# Patient Record
Sex: FEMALE | Race: BLACK OR AFRICAN AMERICAN | NOT HISPANIC OR LATINO | Employment: FULL TIME | URBAN - METROPOLITAN AREA
[De-identification: names, ages, dates, MRNs, and addresses within clinical notes are randomized per-mention and may not be internally consistent; named-entity substitution may affect disease eponyms.]

---

## 2017-02-21 ENCOUNTER — HOSPITAL ENCOUNTER (OUTPATIENT)
Dept: RADIOLOGY | Facility: HOSPITAL | Age: 57
Discharge: HOME | End: 2017-02-21
Attending: INTERNAL MEDICINE
Payer: COMMERCIAL

## 2017-02-21 DIAGNOSIS — Z12.31 OTHER SCREENING MAMMOGRAM: ICD-10-CM

## 2017-02-21 PROCEDURE — 77067 SCR MAMMO BI INCL CAD: CPT | Mod: TC

## 2017-02-21 PROCEDURE — 77063 BREAST TOMOSYNTHESIS BI: CPT | Mod: 26,,, | Performed by: RADIOLOGY

## 2017-02-21 PROCEDURE — 77067 SCR MAMMO BI INCL CAD: CPT | Mod: 26,,, | Performed by: RADIOLOGY

## 2017-02-27 ENCOUNTER — HOSPITAL ENCOUNTER (OUTPATIENT)
Dept: RADIOLOGY | Facility: HOSPITAL | Age: 57
Discharge: HOME | End: 2017-02-27
Attending: INTERNAL MEDICINE
Payer: COMMERCIAL

## 2017-02-27 DIAGNOSIS — R92.8 ABNORMAL MAMMOGRAM: ICD-10-CM

## 2017-02-27 PROCEDURE — 76642 ULTRASOUND BREAST LIMITED: CPT | Mod: TC,LT

## 2017-02-27 PROCEDURE — 77061 BREAST TOMOSYNTHESIS UNI: CPT | Mod: TC,LT

## 2017-02-27 PROCEDURE — 77065 DX MAMMO INCL CAD UNI: CPT | Mod: 26,LT,, | Performed by: RADIOLOGY

## 2017-02-27 PROCEDURE — 77061 BREAST TOMOSYNTHESIS UNI: CPT | Mod: 26,LT,, | Performed by: RADIOLOGY

## 2017-02-27 PROCEDURE — 76642 ULTRASOUND BREAST LIMITED: CPT | Mod: 26,LT,, | Performed by: RADIOLOGY

## 2017-06-11 RX ORDER — LOSARTAN POTASSIUM AND HYDROCHLOROTHIAZIDE 25; 100 MG/1; MG/1
TABLET ORAL
Qty: 90 TABLET | Refills: 4 | Status: SHIPPED | OUTPATIENT
Start: 2017-06-11 | End: 2018-05-18 | Stop reason: SDUPTHER

## 2017-12-12 ENCOUNTER — PATIENT MESSAGE (OUTPATIENT)
Dept: INTERNAL MEDICINE | Facility: CLINIC | Age: 57
End: 2017-12-12

## 2017-12-12 DIAGNOSIS — Z00.00 ROUTINE GENERAL MEDICAL EXAMINATION AT A HEALTH CARE FACILITY: Primary | ICD-10-CM

## 2017-12-12 DIAGNOSIS — R92.8 ABNORMAL MAMMOGRAM: ICD-10-CM

## 2017-12-27 ENCOUNTER — TELEPHONE (OUTPATIENT)
Dept: INTERNAL MEDICINE | Facility: CLINIC | Age: 57
End: 2017-12-27

## 2017-12-27 NOTE — TELEPHONE ENCOUNTER
----- Message from Sherly Avila sent at 12/26/2017  2:33 PM CST -----  Contact: Patient [phone]  The patient need you to fax an order/prescription for the compression hoses to [facility] at [phone]. She lost the original order/prescription that she got from Dr. Clark.    Thank you

## 2017-12-27 NOTE — TELEPHONE ENCOUNTER
Pt lost her order for compression stockings and would like another one sent to [facility]. Please advise

## 2018-02-23 ENCOUNTER — HOSPITAL ENCOUNTER (OUTPATIENT)
Dept: RADIOLOGY | Facility: HOSPITAL | Age: 58
Discharge: HOME | End: 2018-02-23
Attending: INTERNAL MEDICINE
Payer: COMMERCIAL

## 2018-02-23 VITALS — HEIGHT: 66 IN | BODY MASS INDEX: 41.95 KG/M2 | WEIGHT: 261 LBS

## 2018-02-23 DIAGNOSIS — R92.8 ABNORMAL MAMMOGRAM: ICD-10-CM

## 2018-02-23 PROCEDURE — 77062 BREAST TOMOSYNTHESIS BI: CPT | Mod: 26,,, | Performed by: RADIOLOGY

## 2018-02-23 PROCEDURE — 77066 DX MAMMO INCL CAD BI: CPT | Mod: TC,PO

## 2018-02-23 PROCEDURE — 77066 DX MAMMO INCL CAD BI: CPT | Mod: 26,,, | Performed by: RADIOLOGY

## 2018-02-23 PROCEDURE — 76642 ULTRASOUND BREAST LIMITED: CPT | Mod: 26,LT,, | Performed by: RADIOLOGY

## 2018-02-23 PROCEDURE — 76642 ULTRASOUND BREAST LIMITED: CPT | Mod: TC,PO,LT

## 2018-02-26 ENCOUNTER — TELEPHONE (OUTPATIENT)
Dept: RADIOLOGY | Facility: HOSPITAL | Age: 58
End: 2018-02-26

## 2018-02-26 NOTE — TELEPHONE ENCOUNTER
Spoke with patient. Reviewed breast biopsy procedure and reviewed instructions for breast biopsy. Patient expressed understanding and all questions were answered. Provided patient with my phone number to call for any further concerns or questions.   Patient scheduled breast biopsy at the [facility] on 3/6/18

## 2018-02-26 NOTE — TELEPHONE ENCOUNTER
----- Message from Sorin Alexander sent at 2/26/2018  4:01 PM CST -----  Pt needs to reschedule her biopsy. Pt said that date doesn't work for her. Please call pt at [phone].    Pt said March 12th is a good date after 11am.

## 2018-03-03 ENCOUNTER — LAB VISIT (OUTPATIENT)
Dept: LAB | Facility: HOSPITAL | Age: 58
End: 2018-03-03
Attending: INTERNAL MEDICINE
Payer: COMMERCIAL

## 2018-03-03 DIAGNOSIS — Z00.00 ROUTINE GENERAL MEDICAL EXAMINATION AT A HEALTH CARE FACILITY: ICD-10-CM

## 2018-03-03 LAB
25(OH)D3+25(OH)D2 SERPL-MCNC: 41 NG/ML
ALBUMIN SERPL BCP-MCNC: 3.5 G/DL
ALP SERPL-CCNC: 107 U/L
ALT SERPL W/O P-5'-P-CCNC: 24 U/L
ANION GAP SERPL CALC-SCNC: 10 MMOL/L
AST SERPL-CCNC: 21 U/L
BASOPHILS # BLD AUTO: 0.03 K/UL
BASOPHILS NFR BLD: 0.5 %
BILIRUB SERPL-MCNC: 0.7 MG/DL
BUN SERPL-MCNC: 19 MG/DL
CALCIUM SERPL-MCNC: 9.5 MG/DL
CHLORIDE SERPL-SCNC: 108 MMOL/L
CHOLEST SERPL-MCNC: 159 MG/DL
CHOLEST/HDLC SERPL: 3.4 {RATIO}
CO2 SERPL-SCNC: 25 MMOL/L
CREAT SERPL-MCNC: 0.8 MG/DL
DIFFERENTIAL METHOD: ABNORMAL
EOSINOPHIL # BLD AUTO: 0.1 K/UL
EOSINOPHIL NFR BLD: 1.2 %
ERYTHROCYTE [DISTWIDTH] IN BLOOD BY AUTOMATED COUNT: 13.9 %
EST. GFR  (AFRICAN AMERICAN): >60 ML/MIN/1.73 M^2
EST. GFR  (NON AFRICAN AMERICAN): >60 ML/MIN/1.73 M^2
GLUCOSE SERPL-MCNC: 98 MG/DL
HCT VFR BLD AUTO: 38.9 %
HDLC SERPL-MCNC: 47 MG/DL
HDLC SERPL: 29.6 %
HGB BLD-MCNC: 13.2 G/DL
LDLC SERPL CALC-MCNC: 98.6 MG/DL
LYMPHOCYTES # BLD AUTO: 2.7 K/UL
LYMPHOCYTES NFR BLD: 46.2 %
MCH RBC QN AUTO: 30.9 PG
MCHC RBC AUTO-ENTMCNC: 33.9 G/DL
MCV RBC AUTO: 91 FL
MONOCYTES # BLD AUTO: 0.5 K/UL
MONOCYTES NFR BLD: 8.2 %
NEUTROPHILS # BLD AUTO: 2.6 K/UL
NEUTROPHILS NFR BLD: 43.9 %
NONHDLC SERPL-MCNC: 112 MG/DL
NRBC BLD-RTO: 0 /100 WBC
PLATELET # BLD AUTO: 134 K/UL
PMV BLD AUTO: 11.9 FL
POTASSIUM SERPL-SCNC: 4.1 MMOL/L
PROT SERPL-MCNC: 7.2 G/DL
RBC # BLD AUTO: 4.27 M/UL
SODIUM SERPL-SCNC: 143 MMOL/L
TRIGL SERPL-MCNC: 67 MG/DL
TSH SERPL DL<=0.005 MIU/L-ACNC: 1.39 UIU/ML
WBC # BLD AUTO: 5.87 K/UL

## 2018-03-03 PROCEDURE — 85025 COMPLETE CBC W/AUTO DIFF WBC: CPT

## 2018-03-03 PROCEDURE — 80053 COMPREHEN METABOLIC PANEL: CPT

## 2018-03-03 PROCEDURE — 84443 ASSAY THYROID STIM HORMONE: CPT

## 2018-03-03 PROCEDURE — 36415 COLL VENOUS BLD VENIPUNCTURE: CPT

## 2018-03-03 PROCEDURE — 80061 LIPID PANEL: CPT

## 2018-03-03 PROCEDURE — 82306 VITAMIN D 25 HYDROXY: CPT

## 2018-03-12 ENCOUNTER — HOSPITAL ENCOUNTER (OUTPATIENT)
Dept: RADIOLOGY | Facility: HOSPITAL | Age: 58
Discharge: HOME | End: 2018-03-12
Attending: INTERNAL MEDICINE
Payer: COMMERCIAL

## 2018-03-12 DIAGNOSIS — R92.8 ABNORMAL MAMMOGRAM: ICD-10-CM

## 2018-03-12 PROCEDURE — 77065 DX MAMMO INCL CAD UNI: CPT | Mod: 26,LT,, | Performed by: RADIOLOGY

## 2018-03-12 PROCEDURE — 88342 IMHCHEM/IMCYTCHM 1ST ANTB: CPT | Mod: 26,,, | Performed by: PATHOLOGY

## 2018-03-12 PROCEDURE — 27201068 US BREAST BIOPSY WITH IMAGING 1ST SITE LEFT: Mod: PO

## 2018-03-12 PROCEDURE — 88305 TISSUE EXAM BY PATHOLOGIST: CPT | Performed by: PATHOLOGY

## 2018-03-12 PROCEDURE — 88341 IMHCHEM/IMCYTCHM EA ADD ANTB: CPT | Mod: 26,,, | Performed by: PATHOLOGY

## 2018-03-12 PROCEDURE — 88305 TISSUE EXAM BY PATHOLOGIST: CPT | Mod: 26,,, | Performed by: PATHOLOGY

## 2018-03-12 PROCEDURE — 77065 DX MAMMO INCL CAD UNI: CPT | Mod: TC,PO,LT

## 2018-03-12 PROCEDURE — 88342 IMHCHEM/IMCYTCHM 1ST ANTB: CPT | Performed by: PATHOLOGY

## 2018-03-12 PROCEDURE — 19083 BX BREAST 1ST LESION US IMAG: CPT | Mod: LT,,, | Performed by: RADIOLOGY

## 2018-03-14 ENCOUNTER — TELEPHONE (OUTPATIENT)
Dept: SURGERY | Facility: CLINIC | Age: 58
End: 2018-03-14

## 2018-03-14 NOTE — TELEPHONE ENCOUNTER
Patient was transferred to me, she was returning a call from Dr. Villa. Explained why she was calling, discussed [place] second opinion on her biopsy specimen and that it will be about another week before we have a conclusion. Verbalized understanding to all information

## 2018-03-14 NOTE — PROGRESS NOTES
Jeff Presley,    I will call this patient to explain that we do not see breast cancer but will need to send out to lab for further analysis.

## 2018-03-22 ENCOUNTER — TELEPHONE (OUTPATIENT)
Dept: SURGERY | Facility: CLINIC | Age: 58
End: 2018-03-22

## 2018-03-22 NOTE — TELEPHONE ENCOUNTER
Called patient to discuss final [place] interpretation of pathology. Unable to reach patient, no way to leave voicemail, will try again later.

## 2018-03-23 ENCOUNTER — TELEPHONE (OUTPATIENT)
Dept: SURGERY | Facility: CLINIC | Age: 58
End: 2018-03-23

## 2018-03-23 NOTE — TELEPHONE ENCOUNTER
Attempted to call patient again today regarding [place] interpretation of biopsy, no answer. Will try again later

## 2018-03-27 ENCOUNTER — TELEPHONE (OUTPATIENT)
Dept: SURGERY | Facility: CLINIC | Age: 58
End: 2018-03-27

## 2018-03-27 NOTE — TELEPHONE ENCOUNTER
----- Message from Sorin Alexander sent at 3/27/2018 12:14 PM CDT -----  Pt want to know if her biopsy results are in. Please call pt at [phone]

## 2018-03-27 NOTE — TELEPHONE ENCOUNTER
Returned call to patient, discussed biopsy result and [place] diagnosis of angiolipoma,  no atypia/benign, all questions answered, pt advised to follow up in 6 months with repeat imaging per core biopsy protocol, case was reviewed in core conference and deemed concordant. Patient verbalized understanding of all information

## 2018-05-18 ENCOUNTER — OFFICE VISIT (OUTPATIENT)
Dept: INTERNAL MEDICINE | Facility: CLINIC | Age: 58
End: 2018-05-18
Payer: COMMERCIAL

## 2018-05-18 VITALS
WEIGHT: 257.94 LBS | HEIGHT: 65 IN | HEART RATE: 59 BPM | DIASTOLIC BLOOD PRESSURE: 60 MMHG | BODY MASS INDEX: 42.98 KG/M2 | OXYGEN SATURATION: 97 % | SYSTOLIC BLOOD PRESSURE: 120 MMHG

## 2018-05-18 DIAGNOSIS — Z00.00 ROUTINE GENERAL MEDICAL EXAMINATION AT A HEALTH CARE FACILITY: Primary | ICD-10-CM

## 2018-05-18 PROCEDURE — 3074F SYST BP LT 130 MM HG: CPT | Mod: CPTII,S$GLB,, | Performed by: INTERNAL MEDICINE

## 2018-05-18 PROCEDURE — 99999 PR PBB SHADOW E&M-EST. PATIENT-LVL III: CPT | Mod: PBBFAC,,, | Performed by: INTERNAL MEDICINE

## 2018-05-18 PROCEDURE — 3078F DIAST BP <80 MM HG: CPT | Mod: CPTII,S$GLB,, | Performed by: INTERNAL MEDICINE

## 2018-05-18 PROCEDURE — 99396 PREV VISIT EST AGE 40-64: CPT | Mod: S$GLB,,, | Performed by: INTERNAL MEDICINE

## 2018-05-18 RX ORDER — DOXYCYCLINE 100 MG/1
100 CAPSULE ORAL 2 TIMES DAILY
Qty: 20 CAPSULE | Refills: 2 | Status: SHIPPED | OUTPATIENT
Start: 2018-05-18 | End: 2019-06-28 | Stop reason: SDUPTHER

## 2018-05-18 RX ORDER — LOSARTAN POTASSIUM AND HYDROCHLOROTHIAZIDE 25; 100 MG/1; MG/1
1 TABLET ORAL DAILY
Qty: 90 TABLET | Refills: 4 | Status: SHIPPED | OUTPATIENT
Start: 2018-05-18 | End: 2019-06-19 | Stop reason: SDUPTHER

## 2018-05-18 NOTE — PROGRESS NOTES
CHIEF COMPLAINT: Annual exam    HISTORY OF PRESENT ILLNESS: This is a 58-year-old woman who presents for her annual exam.   She has not been using her compression stockings lately. No fever, chills, nausea, vomiting, constipation, diarrhea. She has occasional irritation and redness in the lower legs and she starts on doxycycline right away takes care of the problem     She had a MI/BSO 13 which was complicated by a partial small bowel obstruction and left subclavian DVT. She completed her 3 months of Xarelto. Ultrasound of left upper extremity was fine 2013. NO left arm pain or swelling.       She continues to take Hyzar 100/25 once daily for hypertension. NO chest pain or shortness of breath. She is taking vitamin D supplement 5000 units daily for her vitamin D deficiency.     She had a breast biopsy 3/12/18 -pathology was benign. She needs a repeat mammogram in 6 months.      PAST MEDICAL HISTORY:   1. Hypertension  2. History of cellulitis of the right lower extremity 2008.   3. Venous insufficiency   4. Mild vitamin D deficiency.   5. Obesity.    6. Left subclavian DVT 2013 - postoperatively    7. Partial small bowel obstruction after MI/BSO 13    PAST SURGICAL HISTORY:   1. Appendectomy in 2002 with complication of what sounds like small bowel obstruction.   2. Umbilical hernia repair in .   3. D&C 2008 secondary to irregular menstrual periods.    4. MI/BSO and small bowel resection with anastamosis 2013    SOCIAL HISTORY: She quit smoking in , smoked less than a pack of cigarettes daily for 8 years, drinks alcohol once 1-2 times a month. Single, no children. She is an . Now  of [employer].     FAMILY HISTORY: Mother is living with rheumatoid arthritis. Father  of heart problems. She has a sister and three brothers who are healthy. One brother was recently diagnosed with FSGS. Brother  of complications of drugs    REVIEW OF SYSTEMS: She  "denies fevers, chills, night sweats, fatigue, visual change, hearing loss, sinus congestion, sore throat, chest pain, shortness of breath, nausea, vomiting, constipation, diarrhea, dysuria, hematuria, polydipsia, polyuria, joint pain, muscle pain, headaches, anxiety, depression, insomnia.       PHYSICAL EXAMINATION:      /60 (BP Location: Right arm, Patient Position: Sitting, BP Method: Medium (Manual))   Pulse (!) 59   Ht 5' 5" (1.651 m)   Wt 117 kg (257 lb 15 oz)   LMP 08/21/2013   SpO2 97%   BMI 42.92 kg/m²     General: Alert, oriented. No apparent distress. Affect within normal limits.   Conjunctivae anicteric. Tympanic membranes clear. Oropharynx clear.   Neck supple. No cervical lymphadenopathy, no thyroid enlargement.   Respiratory effort normal. Lungs clear to auscultation.   Heart: Regular rate and rhythm without murmurs, gallops or rubs.   ABDOMEN: soft, non distended, non tender, bowel sounds present, no hepatosplenomgaly  BREAST: no abn seen, no nodules palpated, no axillary lad       ASSESSMENT AND PLAN:       1. Annual exam - discussed diet, exercise and safety issues.  2. Venous insufficiency - compression stockings  3. Morbid obesity - discussed gastric sleeve. She will think about it  4. S/P MI/BSO - doing well - no menopausal symptoms    5. Partial small bowel obstruction - doing well    6.  Left subclavian thrombosis - resolved - completed 3 months of xarelto    7. Hypertension - stable    8. Vitamin D deficency - vitamin D 5,000 units daily  Screening - colonoscopy 12/22/14 due 10 years. Mammogram 3/18. Bone density was normal December 2008     I'll see her back in 12 months , sooner if problems arise.    Adacel 6/16    Answers for HPI/ROS submitted by the patient on 5/16/2018   activity change: No  unexpected weight change: No  neck pain: No  hearing loss: No  rhinorrhea: No  trouble swallowing: No  eye discharge: No  visual disturbance: No  chest tightness: No  wheezing: No  chest " pain: No  palpitations: No  blood in stool: No  constipation: No  vomiting: No  diarrhea: No  polydipsia: No  polyuria: No  difficulty urinating: No  hematuria: No  menstrual problem: No  dysuria: No  joint swelling: No  arthralgias: No  headaches: No  weakness: No  confusion: No  dysphoric mood: No

## 2018-06-17 RX ORDER — LOSARTAN POTASSIUM AND HYDROCHLOROTHIAZIDE 25; 100 MG/1; MG/1
TABLET ORAL
Qty: 90 TABLET | Refills: 4 | Status: SHIPPED | OUTPATIENT
Start: 2018-06-17 | End: 2019-06-28

## 2018-09-24 ENCOUNTER — HOSPITAL ENCOUNTER (OUTPATIENT)
Dept: RADIOLOGY | Facility: HOSPITAL | Age: 58
Discharge: HOME | End: 2018-09-24
Attending: INTERNAL MEDICINE
Payer: COMMERCIAL

## 2018-09-24 DIAGNOSIS — R92.8 ABNORMAL MAMMOGRAM: ICD-10-CM

## 2018-09-24 PROCEDURE — 77065 DX MAMMO INCL CAD UNI: CPT | Mod: TC,PO,LT

## 2018-09-24 PROCEDURE — 77061 BREAST TOMOSYNTHESIS UNI: CPT | Mod: TC,PO,LT

## 2018-09-24 PROCEDURE — 77065 DX MAMMO INCL CAD UNI: CPT | Mod: 26,LT,, | Performed by: RADIOLOGY

## 2018-09-24 PROCEDURE — 77061 BREAST TOMOSYNTHESIS UNI: CPT | Mod: 26,LT,, | Performed by: RADIOLOGY

## 2019-02-04 ENCOUNTER — TELEPHONE (OUTPATIENT)
Dept: INTERNAL MEDICINE | Facility: CLINIC | Age: 59
End: 2019-02-04

## 2019-02-04 DIAGNOSIS — N64.4 BREAST PAIN: Primary | ICD-10-CM

## 2019-02-04 NOTE — TELEPHONE ENCOUNTER
----- Message from María De León sent at 2/4/2019  3:47 PM CST -----  Contact: [employer] message  Please place a order for mammogram once placed send me a message and I can schedule the visit. See [employer] request below.    ----- Message from [facility] Message sent at 2/2/2019 10:13 AM CST -----    Appointment Request From: Marita Gee    With Provider: Misty Clark MD [[facility] - Internal Medicine]    Preferred Date Range: 2/7/2019 - 2/7/2019    Preferred Times: Any time    Reason for visit: Mammogram Request.    Comments:  Feel a bit of discomfort and a knot near area previous biopsied. I am pretty open this week for scheduling.

## 2019-02-05 ENCOUNTER — PATIENT MESSAGE (OUTPATIENT)
Dept: INTERNAL MEDICINE | Facility: CLINIC | Age: 59
End: 2019-02-05

## 2019-02-06 ENCOUNTER — HOSPITAL ENCOUNTER (OUTPATIENT)
Dept: RADIOLOGY | Facility: HOSPITAL | Age: 59
Discharge: HOME | End: 2019-02-06
Attending: INTERNAL MEDICINE
Payer: COMMERCIAL

## 2019-02-06 DIAGNOSIS — N64.4 BREAST PAIN: ICD-10-CM

## 2019-02-06 PROCEDURE — 77062 BREAST TOMOSYNTHESIS BI: CPT | Mod: 26,,, | Performed by: RADIOLOGY

## 2019-02-06 PROCEDURE — 76642 US BREAST LEFT LIMITED: ICD-10-PCS | Mod: 26,LT,, | Performed by: RADIOLOGY

## 2019-02-06 PROCEDURE — 77062 MAMMO DIGITAL DIAGNOSTIC BILAT WITH TOMOSYNTHESIS_CAD: ICD-10-PCS | Mod: 26,,, | Performed by: RADIOLOGY

## 2019-02-06 PROCEDURE — 76642 ULTRASOUND BREAST LIMITED: CPT | Mod: TC,PO,LT

## 2019-02-06 PROCEDURE — 77066 MAMMO DIGITAL DIAGNOSTIC BILAT WITH TOMOSYNTHESIS_CAD: ICD-10-PCS | Mod: 26,,, | Performed by: RADIOLOGY

## 2019-02-06 PROCEDURE — 76642 ULTRASOUND BREAST LIMITED: CPT | Mod: 26,LT,, | Performed by: RADIOLOGY

## 2019-02-06 PROCEDURE — 77066 DX MAMMO INCL CAD BI: CPT | Mod: 26,,, | Performed by: RADIOLOGY

## 2019-02-06 PROCEDURE — 77066 DX MAMMO INCL CAD BI: CPT | Mod: TC,PO

## 2019-03-17 ENCOUNTER — PATIENT MESSAGE (OUTPATIENT)
Dept: INTERNAL MEDICINE | Facility: CLINIC | Age: 59
End: 2019-03-17

## 2019-06-19 RX ORDER — LOSARTAN POTASSIUM AND HYDROCHLOROTHIAZIDE 25; 100 MG/1; MG/1
TABLET ORAL
Qty: 90 TABLET | Refills: 0 | Status: SHIPPED | OUTPATIENT
Start: 2019-06-19 | End: 2019-06-28

## 2019-06-28 ENCOUNTER — LAB VISIT (OUTPATIENT)
Dept: LAB | Facility: HOSPITAL | Age: 59
End: 2019-06-28
Attending: INTERNAL MEDICINE
Payer: COMMERCIAL

## 2019-06-28 ENCOUNTER — OFFICE VISIT (OUTPATIENT)
Dept: INTERNAL MEDICINE | Facility: CLINIC | Age: 59
End: 2019-06-28
Payer: COMMERCIAL

## 2019-06-28 VITALS
HEART RATE: 69 BPM | TEMPERATURE: 98 F | SYSTOLIC BLOOD PRESSURE: 128 MMHG | OXYGEN SATURATION: 96 % | BODY MASS INDEX: 41.95 KG/M2 | WEIGHT: 261 LBS | DIASTOLIC BLOOD PRESSURE: 86 MMHG | HEIGHT: 66 IN

## 2019-06-28 DIAGNOSIS — E53.8 VITAMIN B12 DEFICIENCY: ICD-10-CM

## 2019-06-28 DIAGNOSIS — R10.9 ABDOMINAL PAIN, UNSPECIFIED ABDOMINAL LOCATION: ICD-10-CM

## 2019-06-28 DIAGNOSIS — E55.9 VITAMIN D DEFICIENCY DISEASE: ICD-10-CM

## 2019-06-28 DIAGNOSIS — I10 ESSENTIAL HYPERTENSION: ICD-10-CM

## 2019-06-28 DIAGNOSIS — Z00.00 ROUTINE GENERAL MEDICAL EXAMINATION AT A HEALTH CARE FACILITY: Primary | ICD-10-CM

## 2019-06-28 LAB
25(OH)D3+25(OH)D2 SERPL-MCNC: 38 NG/ML (ref 30–96)
ALBUMIN SERPL BCP-MCNC: 4 G/DL (ref 3.5–5.2)
ALP SERPL-CCNC: 98 U/L (ref 55–135)
ALT SERPL W/O P-5'-P-CCNC: 21 U/L (ref 10–44)
AMYLASE SERPL-CCNC: 28 U/L (ref 20–110)
ANION GAP SERPL CALC-SCNC: 11 MMOL/L (ref 8–16)
AST SERPL-CCNC: 17 U/L (ref 10–40)
BASOPHILS # BLD AUTO: 0.01 K/UL (ref 0–0.2)
BASOPHILS NFR BLD: 0.1 % (ref 0–1.9)
BILIRUB SERPL-MCNC: 0.6 MG/DL (ref 0.1–1)
BUN SERPL-MCNC: 17 MG/DL (ref 8–23)
CALCIUM SERPL-MCNC: 9.3 MG/DL (ref 8.7–10.5)
CHLORIDE SERPL-SCNC: 107 MMOL/L (ref 95–110)
CO2 SERPL-SCNC: 23 MMOL/L (ref 23–29)
CREAT SERPL-MCNC: 1 MG/DL (ref 0.5–1.4)
CRP SERPL-MCNC: 4.7 MG/L (ref 0–8.2)
DIFFERENTIAL METHOD: ABNORMAL
EOSINOPHIL # BLD AUTO: 0.1 K/UL (ref 0–0.5)
EOSINOPHIL NFR BLD: 0.9 % (ref 0–8)
ERYTHROCYTE [DISTWIDTH] IN BLOOD BY AUTOMATED COUNT: 13.3 % (ref 11.5–14.5)
ERYTHROCYTE [SEDIMENTATION RATE] IN BLOOD BY WESTERGREN METHOD: 20 MM/HR (ref 0–36)
EST. GFR  (AFRICAN AMERICAN): >60 ML/MIN/1.73 M^2
EST. GFR  (NON AFRICAN AMERICAN): >60 ML/MIN/1.73 M^2
GLUCOSE SERPL-MCNC: 107 MG/DL (ref 70–110)
HCT VFR BLD AUTO: 40.6 % (ref 37–48.5)
HGB BLD-MCNC: 13.7 G/DL (ref 12–16)
LIPASE SERPL-CCNC: 5 U/L (ref 4–60)
LYMPHOCYTES # BLD AUTO: 2.6 K/UL (ref 1–4.8)
LYMPHOCYTES NFR BLD: 34 % (ref 18–48)
MCH RBC QN AUTO: 31.9 PG (ref 27–31)
MCHC RBC AUTO-ENTMCNC: 33.7 G/DL (ref 32–36)
MCV RBC AUTO: 95 FL (ref 82–98)
MONOCYTES # BLD AUTO: 0.6 K/UL (ref 0.3–1)
MONOCYTES NFR BLD: 7.3 % (ref 4–15)
NEUTROPHILS # BLD AUTO: 4.4 K/UL (ref 1.8–7.7)
NEUTROPHILS NFR BLD: 57.7 % (ref 38–73)
PLATELET # BLD AUTO: 170 K/UL (ref 150–350)
PMV BLD AUTO: 10.1 FL (ref 9.2–12.9)
POTASSIUM SERPL-SCNC: 3.7 MMOL/L (ref 3.5–5.1)
PROT SERPL-MCNC: 7.4 G/DL (ref 6–8.4)
RBC # BLD AUTO: 4.29 M/UL (ref 4–5.4)
SODIUM SERPL-SCNC: 141 MMOL/L (ref 136–145)
TSH SERPL DL<=0.005 MIU/L-ACNC: 1.62 UIU/ML (ref 0.4–4)
VIT B12 SERPL-MCNC: 1941 PG/ML (ref 210–950)
WBC # BLD AUTO: 7.68 K/UL (ref 3.9–12.7)

## 2019-06-28 PROCEDURE — 3079F DIAST BP 80-89 MM HG: CPT | Mod: CPTII,S$GLB,, | Performed by: INTERNAL MEDICINE

## 2019-06-28 PROCEDURE — 82306 VITAMIN D 25 HYDROXY: CPT

## 2019-06-28 PROCEDURE — 3074F PR MOST RECENT SYSTOLIC BLOOD PRESSURE < 130 MM HG: ICD-10-PCS | Mod: CPTII,S$GLB,, | Performed by: INTERNAL MEDICINE

## 2019-06-28 PROCEDURE — 99999 PR PBB SHADOW E&M-EST. PATIENT-LVL IV: CPT | Mod: PBBFAC,,, | Performed by: INTERNAL MEDICINE

## 2019-06-28 PROCEDURE — 36415 COLL VENOUS BLD VENIPUNCTURE: CPT

## 2019-06-28 PROCEDURE — 3074F SYST BP LT 130 MM HG: CPT | Mod: CPTII,S$GLB,, | Performed by: INTERNAL MEDICINE

## 2019-06-28 PROCEDURE — 83690 ASSAY OF LIPASE: CPT

## 2019-06-28 PROCEDURE — 86140 C-REACTIVE PROTEIN: CPT

## 2019-06-28 PROCEDURE — 82607 VITAMIN B-12: CPT

## 2019-06-28 PROCEDURE — 82150 ASSAY OF AMYLASE: CPT

## 2019-06-28 PROCEDURE — 99396 PREV VISIT EST AGE 40-64: CPT | Mod: S$GLB,,, | Performed by: INTERNAL MEDICINE

## 2019-06-28 PROCEDURE — 99396 PR PREVENTIVE VISIT,EST,40-64: ICD-10-PCS | Mod: S$GLB,,, | Performed by: INTERNAL MEDICINE

## 2019-06-28 PROCEDURE — 84443 ASSAY THYROID STIM HORMONE: CPT

## 2019-06-28 PROCEDURE — 99999 PR PBB SHADOW E&M-EST. PATIENT-LVL IV: ICD-10-PCS | Mod: PBBFAC,,, | Performed by: INTERNAL MEDICINE

## 2019-06-28 PROCEDURE — 3079F PR MOST RECENT DIASTOLIC BLOOD PRESSURE 80-89 MM HG: ICD-10-PCS | Mod: CPTII,S$GLB,, | Performed by: INTERNAL MEDICINE

## 2019-06-28 PROCEDURE — 85652 RBC SED RATE AUTOMATED: CPT

## 2019-06-28 PROCEDURE — 85025 COMPLETE CBC W/AUTO DIFF WBC: CPT

## 2019-06-28 PROCEDURE — 80053 COMPREHEN METABOLIC PANEL: CPT

## 2019-06-28 RX ORDER — DOXYCYCLINE 100 MG/1
100 CAPSULE ORAL 2 TIMES DAILY
Qty: 20 CAPSULE | Refills: 2 | Status: SHIPPED | OUTPATIENT
Start: 2019-06-28 | End: 2020-09-19 | Stop reason: SDUPTHER

## 2019-06-28 RX ORDER — LOSARTAN POTASSIUM AND HYDROCHLOROTHIAZIDE 25; 100 MG/1; MG/1
1 TABLET ORAL DAILY
Qty: 90 TABLET | Refills: 4 | Status: SHIPPED | OUTPATIENT
Start: 2019-06-28 | End: 2019-09-27

## 2019-06-28 NOTE — PROGRESS NOTES
CHIEF COMPLAINT: Annual exam    HISTORY OF PRESENT ILLNESS: This is a 58-year-old woman who presents for her annual exam    .   She has not been using her compression stockings lately. No fever, chills, nausea, vomiting, constipation, diarrhea. She has occasional irritation and redness in the lower legs and she starts on doxycycline right away takes care of the problem     She had a MI/BSO 9/4/13 which was complicated by a partial small bowel obstruction and left subclavian DVT. She completed her 3 months of Xarelto. Ultrasound of left upper extremity was fine 11/2013. NO left arm pain or swelling.       She continues to take Hyzar 100/25 once daily for hypertension. NO chest pain or shortness of breath. She is taking vitamin D supplement 5000 units daily for her vitamin D deficiency.      She had a breast biopsy 3/12/18 -pathology was benign.     Two days ago she had sudden onset of abdominal cramping and diarrhea when she was finishing her meal. She then had nausea and vomiting.  Episode lasted about an hour. She went home and laid down. She did not go to work yesterday.  Pain yesterday was tolerable. Pain today is slight. No fever, chills.  Nausea, vomiting, and diarrhea has not returned. She had a similar episode 3 weeks ago.      PAST MEDICAL HISTORY:   1. Hypertension  2. History of cellulitis of the right lower extremity June 2008.   3. Venous insufficiency   4. Mild vitamin D deficiency.   5. Obesity.    6. Left subclavian DVT 9/2013 - postoperatively    7. Partial small bowel obstruction after MI/BSO 9/4/13    PAST SURGICAL HISTORY:   1. Appendectomy in February 2002 with complication of what sounds like small bowel obstruction.   2. Umbilical hernia repair in 2003.   3. D&C April 2008 secondary to irregular menstrual periods.    4. MI/BSO and small bowel resection with anastamosis 9/2013    SOCIAL HISTORY: She quit smoking in 2002, smoked less than a pack of cigarettes daily for 8 years, drinks alcohol  "once 1-2 times a month. Single, no children. She is an . Now  of [email or web address].     FAMILY HISTORY: Mother is living with rheumatoid arthritis. Father  of heart problems. She has a sister and three brothers who are healthy. One brother was recently diagnosed with FSGS. Brother  of complications of drugs     REVIEW OF SYSTEMS: She denies fevers, chills, night sweats, fatigue, visual change, hearing loss, sinus congestion, sore throat, chest pain, shortness of breath, nausea, vomiting, constipation, diarrhea, dysuria, hematuria, polydipsia, polyuria, joint pain, muscle pain, headaches, anxiety, depression, insomnia.       PHYSICAL EXAMINATION:     /86 (BP Location: Right arm, Patient Position: Sitting, BP Method: Large (Manual))   Pulse 69   Temp 98.3 °F (36.8 °C) (Oral)   Ht 5' 6" (1.676 m)   Wt 118.4 kg (261 lb 0.4 oz)   LMP 2013   SpO2 96%   BMI 42.13 kg/m²   General: Alert, oriented. No apparent distress. Affect within normal limits.   Conjunctivae anicteric. Tympanic membranes clear. Oropharynx clear.   Neck supple. No cervical lymphadenopathy, no thyroid enlargement.   Respiratory effort normal. Lungs clear to auscultation.   Heart: Regular rate and rhythm without murmurs, gallops or rubs.   ABDOMEN: soft, non distended, non tender, bowel sounds present, no hepatosplenomgaly    ASSESSMENT AND PLAN:       1. Annual exam - discussed diet, exercise and safety issues.  2. Venous insufficiency - compression stockings  3. Morbid obesity - work on diet and exercise  4. S/P MI/BSO - doing well - no menopausal symptoms    5. Partial small bowel obstruction - doing well    6.  Left subclavian thrombosis - resolved - completed 3 months of xarelto    7. Hypertension - stable    8. Vitamin D deficency - vitamin D 5,000 units daily  9. Episode of nausea, vomiting and diarrhea- labs and US abdomen   Screening - colonoscopy 14 due 10 years. Mammogram . Bone density was " normal 5/2014     I'll see her back in 12 months , sooner if problems arise.    Adacel 6/16  Answers for HPI/ROS submitted by the patient on 6/27/2019   Abdominal pain  Chronicity: new  Onset: 1 to 4 weeks ago  Onset quality: sudden  Frequency: intermittently  Episode duration: 8 hours  Progression since onset: gradually improving  Pain location: epigastric region, suprapubic region  Pain - numeric: 7/10  Pain quality: cramping, sharp, colicky  anorexia: No  arthralgias: No  belching: No  constipation: No  diarrhea: Yes  dysuria: No  fever: No  flatus: Yes  frequency: No  headaches: No  hematochezia: No  hematuria: No  melena: No  myalgias: Yes  nausea: Yes  weight loss: No  vomiting: Yes  Aggravated by: certain positions, movement  Relieved by: certain positions, sitting up, vomiting  Pain severity: mild  Treatments tried: nothing  Improvement on treatment: significant  abdominal surgery: Yes  colon cancer: No  Crohn's disease: No  gallstones: No  GERD: No  irritable bowel syndrome: No  kidney stones: No  pancreatitis: No  PUD: No  ulcerative colitis: No  UTI: No

## 2019-07-05 ENCOUNTER — HOSPITAL ENCOUNTER (OUTPATIENT)
Dept: RADIOLOGY | Facility: OTHER | Age: 59
Discharge: HOME | End: 2019-07-05
Attending: INTERNAL MEDICINE
Payer: COMMERCIAL

## 2019-07-05 DIAGNOSIS — R10.9 ABDOMINAL PAIN, UNSPECIFIED ABDOMINAL LOCATION: ICD-10-CM

## 2019-07-05 PROCEDURE — 76700 US EXAM ABDOM COMPLETE: CPT | Mod: 26,,, | Performed by: RADIOLOGY

## 2019-07-05 PROCEDURE — 76700 US ABDOMEN COMPLETE: ICD-10-PCS | Mod: 26,,, | Performed by: RADIOLOGY

## 2019-07-05 PROCEDURE — 76700 US EXAM ABDOM COMPLETE: CPT | Mod: TC

## 2019-07-07 ENCOUNTER — TELEPHONE (OUTPATIENT)
Dept: INTERNAL MEDICINE | Facility: CLINIC | Age: 59
End: 2019-07-07

## 2019-07-07 DIAGNOSIS — K76.0 FATTY LIVER: Primary | ICD-10-CM

## 2019-07-08 NOTE — TELEPHONE ENCOUNTER
Please notify pt    You have fatty liver. There is possibly an area of the liver that does not have fat in it which causes the liver to look different.  You need to have a repeat ultrasound of the liver in 3-4 months to make sure this area does not change.     Fatty liver would not have caused your symptoms of nausea, vomiting and diarrhea.    Weight loss helps fatty liver    Please book ultasound of the abdomen for 3 months    SF

## 2019-07-29 ENCOUNTER — PATIENT MESSAGE (OUTPATIENT)
Dept: INTERNAL MEDICINE | Facility: CLINIC | Age: 59
End: 2019-07-29

## 2019-07-29 DIAGNOSIS — R10.84 GENERALIZED ABDOMINAL PAIN: ICD-10-CM

## 2019-07-29 DIAGNOSIS — R10.9 ABDOMINAL PAIN, UNSPECIFIED ABDOMINAL LOCATION: Primary | ICD-10-CM

## 2019-08-01 ENCOUNTER — HOSPITAL ENCOUNTER (OUTPATIENT)
Dept: RADIOLOGY | Facility: HOSPITAL | Age: 59
Discharge: HOME | End: 2019-08-01
Attending: INTERNAL MEDICINE
Payer: COMMERCIAL

## 2019-08-01 DIAGNOSIS — R10.84 GENERALIZED ABDOMINAL PAIN: ICD-10-CM

## 2019-08-01 DIAGNOSIS — Z12.11 SCREENING FOR COLON CANCER: Primary | ICD-10-CM

## 2019-08-01 DIAGNOSIS — R11.2 NAUSEA AND VOMITING, INTRACTABILITY OF VOMITING NOT SPECIFIED, UNSPECIFIED VOMITING TYPE: Primary | ICD-10-CM

## 2019-08-01 LAB
CREAT SERPL-MCNC: 0.8 MG/DL (ref 0.5–1.4)
SAMPLE: NORMAL

## 2019-08-01 PROCEDURE — 74178 CT ABD&PLV WO CNTR FLWD CNTR: CPT | Mod: 26,,, | Performed by: RADIOLOGY

## 2019-08-01 PROCEDURE — 74178 CT ABDOMEN PELVIS W WO CONTRAST: ICD-10-PCS | Mod: 26,,, | Performed by: RADIOLOGY

## 2019-08-01 PROCEDURE — 74178 CT ABD&PLV WO CNTR FLWD CNTR: CPT | Mod: TC

## 2019-08-01 PROCEDURE — 25500020 PHARM REV CODE 255: Performed by: INTERNAL MEDICINE

## 2019-08-01 RX ADMIN — IOHEXOL 15 ML: 350 INJECTION, SOLUTION INTRAVENOUS at 11:08

## 2019-08-01 RX ADMIN — IOHEXOL 100 ML: 350 INJECTION, SOLUTION INTRAVENOUS at 12:08

## 2019-08-26 ENCOUNTER — PATIENT MESSAGE (OUTPATIENT)
Dept: INTERNAL MEDICINE | Facility: CLINIC | Age: 59
End: 2019-08-26

## 2019-09-27 ENCOUNTER — TELEPHONE (OUTPATIENT)
Dept: INTERNAL MEDICINE | Facility: CLINIC | Age: 59
End: 2019-09-27

## 2019-09-27 RX ORDER — VALSARTAN AND HYDROCHLOROTHIAZIDE 320; 25 MG/1; MG/1
1 TABLET, FILM COATED ORAL DAILY
Qty: 90 TABLET | Refills: 3 | Status: SHIPPED | OUTPATIENT
Start: 2019-09-27 | End: 2019-12-31

## 2019-09-27 NOTE — TELEPHONE ENCOUNTER
----- Message from Bassam San sent at 9/27/2019 11:42 AM CDT -----  Contact: [facility]   Pharmacy is calling to clarify an RX.  RX name: losartan-hydrochlorothiazide 100-25 mg (HYZAAR) 100-25 mg per tablet   What do they need to clarify:  Script is on back order is there something else instead to prescribe   Comments:

## 2019-09-27 NOTE — TELEPHONE ENCOUNTER
Valsartan hct sent to pharmacy - guerline pascual pt that losartan hct was changed to valsartan hct due to drug being on back order

## 2019-10-05 ENCOUNTER — HOSPITAL ENCOUNTER (OUTPATIENT)
Dept: RADIOLOGY | Facility: HOSPITAL | Age: 59
Discharge: HOME | End: 2019-10-05
Attending: INTERNAL MEDICINE
Payer: COMMERCIAL

## 2019-10-05 DIAGNOSIS — K76.0 FATTY LIVER: ICD-10-CM

## 2019-10-05 PROCEDURE — 76700 US ABDOMEN COMPLETE: ICD-10-PCS | Mod: 26,,, | Performed by: RADIOLOGY

## 2019-10-05 PROCEDURE — 76700 US EXAM ABDOM COMPLETE: CPT | Mod: TC

## 2019-10-05 PROCEDURE — 76700 US EXAM ABDOM COMPLETE: CPT | Mod: 26,,, | Performed by: RADIOLOGY

## 2019-10-10 ENCOUNTER — PATIENT MESSAGE (OUTPATIENT)
Dept: INTERNAL MEDICINE | Facility: CLINIC | Age: 59
End: 2019-10-10

## 2019-10-21 ENCOUNTER — PATIENT OUTREACH (OUTPATIENT)
Dept: ADMINISTRATIVE | Facility: OTHER | Age: 59
End: 2019-10-21

## 2019-10-25 ENCOUNTER — OFFICE VISIT (OUTPATIENT)
Dept: GASTROENTEROLOGY | Facility: CLINIC | Age: 59
End: 2019-10-25
Payer: COMMERCIAL

## 2019-10-25 VITALS
HEART RATE: 53 BPM | WEIGHT: 262.81 LBS | DIASTOLIC BLOOD PRESSURE: 76 MMHG | HEIGHT: 66 IN | BODY MASS INDEX: 42.24 KG/M2 | SYSTOLIC BLOOD PRESSURE: 158 MMHG

## 2019-10-25 DIAGNOSIS — R10.33 PERIUMBILICAL ABDOMINAL PAIN: Primary | ICD-10-CM

## 2019-10-25 PROCEDURE — 3078F PR MOST RECENT DIASTOLIC BLOOD PRESSURE < 80 MM HG: ICD-10-PCS | Mod: CPTII,S$GLB,, | Performed by: INTERNAL MEDICINE

## 2019-10-25 PROCEDURE — 99999 PR PBB SHADOW E&M-EST. PATIENT-LVL III: ICD-10-PCS | Mod: PBBFAC,,, | Performed by: INTERNAL MEDICINE

## 2019-10-25 PROCEDURE — 3077F SYST BP >= 140 MM HG: CPT | Mod: CPTII,S$GLB,, | Performed by: INTERNAL MEDICINE

## 2019-10-25 PROCEDURE — 3008F PR BODY MASS INDEX (BMI) DOCUMENTED: ICD-10-PCS | Mod: CPTII,S$GLB,, | Performed by: INTERNAL MEDICINE

## 2019-10-25 PROCEDURE — 3008F BODY MASS INDEX DOCD: CPT | Mod: CPTII,S$GLB,, | Performed by: INTERNAL MEDICINE

## 2019-10-25 PROCEDURE — 99999 PR PBB SHADOW E&M-EST. PATIENT-LVL III: CPT | Mod: PBBFAC,,, | Performed by: INTERNAL MEDICINE

## 2019-10-25 PROCEDURE — 3078F DIAST BP <80 MM HG: CPT | Mod: CPTII,S$GLB,, | Performed by: INTERNAL MEDICINE

## 2019-10-25 PROCEDURE — 3077F PR MOST RECENT SYSTOLIC BLOOD PRESSURE >= 140 MM HG: ICD-10-PCS | Mod: CPTII,S$GLB,, | Performed by: INTERNAL MEDICINE

## 2019-10-25 PROCEDURE — 99204 OFFICE O/P NEW MOD 45 MIN: CPT | Mod: S$GLB,,, | Performed by: INTERNAL MEDICINE

## 2019-10-25 PROCEDURE — 99204 PR OFFICE/OUTPT VISIT, NEW, LEVL IV, 45-59 MIN: ICD-10-PCS | Mod: S$GLB,,, | Performed by: INTERNAL MEDICINE

## 2019-10-25 NOTE — LETTER
October 25, 2019      Misty Clark MD  [address]  [place] [address]           [facility] - Gastroenterology  [address]  [place] [address]  Phone: [phone]  Fax: [phone]          Patient: Marita Gee   MR Number: [id number]   YOB: 1960   Date of Visit: 10/25/2019       Dear Dr. Misty Clark:    Thank you for referring Marita Gee to me for evaluation. Attached you will find relevant portions of my assessment and plan of care.    If you have questions, please do not hesitate to call me. I look forward to following Marita Gee along with you.    Sincerely,    Janak San MD    Enclosure  CC:  No Recipients    If you would like to receive this communication electronically, please contact [email or web address] or [phone] to request more information on [email or web address] Link access.    For providers and/or their staff who would like to refer a patient to [facility], please contact us through our one-stop-shop provider referral line, [facility], at [phone].    If you feel you have received this communication in error or would no longer like to receive these types of communications, please e-mail [email or web address]

## 2019-10-25 NOTE — PROGRESS NOTES
"Reason for visit:  Recent 2 episodes of periumbilical abdominal pain    HPI:  is a 59-year-old who presented with 2 episodes of periumbilical abdominal pain" twisting type" pain appearing and crescendo decrescendo pattern in March and August of this year this was associated with vomiting.  Both episodes resolved without any intervention.  She has history of abdominal surgeries dense adhesions and most recently had umbilical hernia repair.  She denies any dysphagia, odynophagia, nausea or vomiting on a regular basis.  Denies any heartburn or acid regurgitation.  Denies any NSAID use.  Denies any melena, maroon stools or bright red blood per rectum. She has not had any further pain since August.  Denies any weight loss or change in appetite.  Patient has regular bowel movements daily with no history of constipation.    Past medical, surgical, social family history reviewed in epic    Medication allergies reviewed in epic    Review of systems:  Constitutional:  No fever, no chills, no weight loss, appetite stable  Eyes:  No visual changes or red eyes  ENT:  No odynophagia or hoarseness of voice  Cardiovascular:  No angina or palpitation  Respiratory:  No shortness of breath or wheezing  Genitourinary:  No dysuria or frequency  Musculoskeletal:  No myalgias or arthralgias  Skin can:  No pruritus or eczema  Neurologic:  No headaches or seizures  Psychiatry:  No anxiety or depression  Gastrointestinal:  See HPI    Physical exam:  Vitals see epic, awake, alert, oriented x3 in no acute distress  Neck:  Supple, no carotid bruit, no cervical adenopathy  Abdomen:  Obese, soft, nondistended, tenderness to deep palpation around the periumbilical region with scar felt on palpation, bowel sounds are normal, no abdominal bruits heard  Eyes:  Conjunctivae anicteric, not injected  ENT:  Oral mucosa moist  Cardiovascular:  S1, S2 normal:  No murmurs or gallops  Respiratory:  Bilateral air entry equal with no rhonchi " crackles  Skin:  No palmar erythema or spider angiomata  Neurologic:  No asterixis  Psychiatric:  Affect appropriate  Lower extremity:  Bilateral nonpitting edema with venous stasis noted in the lower anterior legs.    Prior labs, imaging, colonoscopy reports reviewed.    Impression:  Recent bouts of most likely partial bowel obstruction in the setting of abdominal adhesions.    Recommendation:  No further evaluation recommended at this time.  Patient to seek emergency care if similar episodes occur without resolution.  No need for endoscopic evaluation at this time

## 2019-10-28 ENCOUNTER — IMMUNIZATION (OUTPATIENT)
Dept: PHARMACY | Facility: CLINIC | Age: 59
End: 2019-10-28
Payer: COMMERCIAL

## 2019-12-31 ENCOUNTER — TELEPHONE (OUTPATIENT)
Dept: INTERNAL MEDICINE | Facility: CLINIC | Age: 59
End: 2019-12-31

## 2019-12-31 RX ORDER — HYDROCHLOROTHIAZIDE 25 MG/1
25 TABLET ORAL DAILY
Qty: 30 TABLET | Refills: 0 | Status: SHIPPED | OUTPATIENT
Start: 2019-12-31 | End: 2020-02-10 | Stop reason: ALTCHOICE

## 2019-12-31 RX ORDER — VALSARTAN AND HYDROCHLOROTHIAZIDE 320; 25 MG/1; MG/1
1 TABLET, FILM COATED ORAL DAILY
Qty: 90 TABLET | Refills: 0 | OUTPATIENT
Start: 2019-12-31 | End: 2020-12-30

## 2019-12-31 RX ORDER — VALSARTAN 320 MG/1
320 TABLET ORAL DAILY
Qty: 30 TABLET | Refills: 0 | Status: SHIPPED | OUTPATIENT
Start: 2019-12-31 | End: 2020-02-10 | Stop reason: ALTCHOICE

## 2019-12-31 NOTE — TELEPHONE ENCOUNTER
----- Message from Michelle Thomas sent at 12/31/2019  2:46 PM CST -----  Contact: Jassi Lynchkristopherblaine   Pharmacy is calling to clarify an RX.  RX name:  valsartan-hydrochlorothiazide (DIOVAN-HCT) 320-25 mg per tablet  What do they need to clarify:  Out of stock would like to change to 2 separate Rxs  Comments:

## 2019-12-31 NOTE — PROGRESS NOTES
Refill Authorization Note     is requesting a refill authorization.    Brief assessment and rationale for refill: QUICK DC: rts(9/20)                                         Comments:   Last Prescribed Info:    Ordering Provider: -- LULU #:  -- NPI:  --    Authorizing Provider: Misty Clark MD LULU #:  [id number] NPI:  [id number]    Ordering User:  Misty Clark MD               [id number] [address] [facility] [address] LULU #:  [id number]     Pharmacy Comments:  --          Fill quantity remaining:  -- Fill quantity used:  -- Next fill due: --       Outpatient Medication Detail      Disp Refills Start End    valsartan-hydrochlorothiazide (DIOVAN-HCT) 320-25 mg per tablet 90 tablet 3 9/27/2019 9/26/2020    Sig - Route: Take 1 tablet by mouth once daily. - Oral    Sent to pharmacy as: valsartan-hydrochlorothiazide (DIOVAN-HCT) 320-25 mg per tablet    E-Prescribing Status: Receipt confirmed by pharmacy (9/27/2019  5:57 PM CDT)         Appointments  past 12m or future 3m with PCP    Date Provider   Last Visit   6/28/2019 Misty Clark MD   Next Visit   Visit date not found Misty Clark MD

## 2020-01-09 ENCOUNTER — PATIENT MESSAGE (OUTPATIENT)
Dept: PHARMACY | Facility: HOSPITAL | Age: 60
End: 2020-01-09

## 2020-01-13 ENCOUNTER — PATIENT MESSAGE (OUTPATIENT)
Dept: INTERNAL MEDICINE | Facility: CLINIC | Age: 60
End: 2020-01-13

## 2020-01-13 DIAGNOSIS — R92.8 ABNORMAL MAMMOGRAM: Primary | ICD-10-CM

## 2020-01-20 ENCOUNTER — OFFICE VISIT (OUTPATIENT)
Dept: INTERNAL MEDICINE | Facility: CLINIC | Age: 60
End: 2020-01-20
Payer: COMMERCIAL

## 2020-01-20 ENCOUNTER — IMMUNIZATION (OUTPATIENT)
Dept: PHARMACY | Facility: CLINIC | Age: 60
End: 2020-01-20
Payer: COMMERCIAL

## 2020-01-20 VITALS
DIASTOLIC BLOOD PRESSURE: 88 MMHG | HEIGHT: 66 IN | BODY MASS INDEX: 42.2 KG/M2 | SYSTOLIC BLOOD PRESSURE: 135 MMHG | OXYGEN SATURATION: 98 % | HEART RATE: 60 BPM | WEIGHT: 262.56 LBS

## 2020-01-20 DIAGNOSIS — R60.0 SUBMANDIBULAR GLAND SWELLING: Primary | ICD-10-CM

## 2020-01-20 PROCEDURE — 3008F PR BODY MASS INDEX (BMI) DOCUMENTED: ICD-10-PCS | Mod: CPTII,S$GLB,, | Performed by: STUDENT IN AN ORGANIZED HEALTH CARE EDUCATION/TRAINING PROGRAM

## 2020-01-20 PROCEDURE — 3079F DIAST BP 80-89 MM HG: CPT | Mod: CPTII,S$GLB,, | Performed by: STUDENT IN AN ORGANIZED HEALTH CARE EDUCATION/TRAINING PROGRAM

## 2020-01-20 PROCEDURE — 99213 PR OFFICE/OUTPT VISIT, EST, LEVL III, 20-29 MIN: ICD-10-PCS | Mod: S$GLB,,, | Performed by: STUDENT IN AN ORGANIZED HEALTH CARE EDUCATION/TRAINING PROGRAM

## 2020-01-20 PROCEDURE — 3079F PR MOST RECENT DIASTOLIC BLOOD PRESSURE 80-89 MM HG: ICD-10-PCS | Mod: CPTII,S$GLB,, | Performed by: STUDENT IN AN ORGANIZED HEALTH CARE EDUCATION/TRAINING PROGRAM

## 2020-01-20 PROCEDURE — 99213 OFFICE O/P EST LOW 20 MIN: CPT | Mod: S$GLB,,, | Performed by: STUDENT IN AN ORGANIZED HEALTH CARE EDUCATION/TRAINING PROGRAM

## 2020-01-20 PROCEDURE — 3075F PR MOST RECENT SYSTOLIC BLOOD PRESS GE 130-139MM HG: ICD-10-PCS | Mod: CPTII,S$GLB,, | Performed by: STUDENT IN AN ORGANIZED HEALTH CARE EDUCATION/TRAINING PROGRAM

## 2020-01-20 PROCEDURE — 99999 PR PBB SHADOW E&M-EST. PATIENT-LVL IV: CPT | Mod: PBBFAC,,, | Performed by: STUDENT IN AN ORGANIZED HEALTH CARE EDUCATION/TRAINING PROGRAM

## 2020-01-20 PROCEDURE — 3008F BODY MASS INDEX DOCD: CPT | Mod: CPTII,S$GLB,, | Performed by: STUDENT IN AN ORGANIZED HEALTH CARE EDUCATION/TRAINING PROGRAM

## 2020-01-20 PROCEDURE — 99999 PR PBB SHADOW E&M-EST. PATIENT-LVL IV: ICD-10-PCS | Mod: PBBFAC,,, | Performed by: STUDENT IN AN ORGANIZED HEALTH CARE EDUCATION/TRAINING PROGRAM

## 2020-01-20 PROCEDURE — 3075F SYST BP GE 130 - 139MM HG: CPT | Mod: CPTII,S$GLB,, | Performed by: STUDENT IN AN ORGANIZED HEALTH CARE EDUCATION/TRAINING PROGRAM

## 2020-01-20 NOTE — PATIENT INSTRUCTIONS
Submandibular gland swelling    Sour candy or lemon once per hour while awake for the next 7 days.     Warm compresses to affected side.     If you develop fevers, chills, weight loss, worsening swelling let me know and we will investigate further.

## 2020-01-20 NOTE — PROGRESS NOTES
INTERNAL [facility]ICINE [facility]IDENT [facility]  CLINIC NOTE    Patient Name: Marita Gee  YOB: 1960    PRESENTING HISTORY   Chief Complaint: Neck swelling    History of Present Illness:  Ms. Marita Gee is a 59 y.o. female here with swelling to right neck.     Patient had her teeth cleaned last Wednesday. On Friday she developed a lump on her right neck. It developed abruptly and has remained stable in size. No pain to the site or in her mouth. No fevers or chills. No weight loss. No anxiety or tremors. She has not eaten any sour foods since onset.     Review of Systems:  Constitutional: no fever or chills  ENT: no nasal congestion or sore throat  Respiratory: no cough or shortness of breath  Cardiovascular: no chest pain or palpitations  Gastrointestinal: no nausea or vomiting, no abdominal pain  Musculoskeletal: no arthralgias or myalgias  Neurological: no syncope or headaches  Skin: No rashes    PAST HISTORY:     Past Medical History:   Diagnosis Date    Breast cyst     History of cellulitis      Right lower extremity    HTN (hypertension) 8/11/2012    Obesity 9/27/2012    Partial small bowel obstruction 9/2013    after MI/BS)    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 Barksdale, General Surgeon, was present for a small bowel resection as well as  lysis of adhesions     TOTAL ABDOMINAL HYSTERECTOMY W/ BILATERAL SALPINGOOPHORECTOMY  9/2013     with small bowel resection with anastamosis    UMBILICAL HERNIA REPAIR  2003       Family History   Problem Relation Age of Onset    Hypertension Mother     Cataracts Mother     Cancer Paternal Uncle         Lung Cancer    No Known Problems Father     No Known Problems Brother     No Known Problems Brother     No Known Problems Sister     No Known Problems Brother     No Known Problems Maternal Aunt     No Known Problems Maternal Uncle     No Known Problems Paternal Aunt     No Known Problems Maternal Grandmother     No Known Problems Maternal Grandfather     No Known Problems Paternal Grandmother     No Known Problems Paternal Grandfather     Amblyopia Neg Hx     Blindness Neg Hx     Diabetes Neg Hx     Glaucoma Neg Hx     Macular degeneration Neg Hx     Retinal detachment Neg Hx     Strabismus Neg Hx     Stroke Neg Hx     Thyroid disease Neg Hx     Colon cancer Neg Hx     Esophageal cancer Neg Hx        Social History     Socioeconomic History    Marital status: Single     Spouse name: Not on file    Number of children: Not on file    Years of education: Not on file    Highest education level: Not on file   Occupational History    Not on file   Social Needs    Financial resource strain: Not on file    Food insecurity:     Worry: Not on file     Inability: Not on file    Transportation needs:     Medical: Not on file     Non-medical: Not on file   Tobacco Use    Smoking status: Former Smoker     Packs/day: 0.50     Years: 12.00     Pack years: 6.00     Last attempt to quit: 2002     Years since quittin.9    Smokeless tobacco: Never Used    Tobacco comment: Quit , less than a pack per day for 8 years   Substance and Sexual Activity    Alcohol use: Yes     Comment: once a week    Drug use: No    Sexual activity: Never   Lifestyle    Physical activity:     Days per week: 2 days     Minutes per session: 100 min    Stress: Not at all   Relationships    Social  "connections:     Talks on phone: Not on file     Gets together: Not on file     Attends Yazidism service: Not on file     Active member of club or organization: Not on file     Attends meetings of clubs or organizations: Not on file     Relationship status: Not on file   Other Topics Concern    Not on file   Social History Narrative    Not on file       MEDICATIONS & ALLERGIES:     Current Outpatient Medications on File Prior to Visit   Medication Sig    cholecalciferol, vitamin D3, (VITAMIN D3) 5,000 unit Tab Take 5,000 Units by mouth once daily.    doxycycline (MONODOX) 100 MG capsule Take 1 capsule (100 mg total) by mouth 2 (two) times daily.    furosemide (LASIX) 20 MG tablet Take 1 tablet (20 mg total) by mouth daily as needed.    hydroCHLOROthiazide (HYDRODIURIL) 25 MG tablet Take 1 tablet (25 mg total) by mouth once daily.    multivitamin capsule Take 1 capsule by mouth once daily.    valsartan (DIOVAN) 320 MG tablet Take 1 tablet (320 mg total) by mouth once daily.    varicella-zoster gE-AS01B, PF, (SHINGRIX, PF,) 50 mcg/0.5 mL injection Inject into the muscle.     No current facility-administered medications on file prior to visit.        Review of patient's allergies indicates:   Allergen Reactions    Bactrim [sulfamethoxazole-trimethoprim]      rash       OBJECTIVE:   Vital Signs:  Vitals:    01/20/20 1527   BP: 135/88   Pulse: 60   SpO2: 98%   Weight: 119.1 kg (262 lb 9.1 oz)   Height: 5' 6" (1.676 m)       No results found for this or any previous visit (from the past 24 hour(s)).      Physical Exam:   General:  Well appearing. No distress  HEENT:  Normal dentition. No obvious oral abscess  Neck: Right neck with 4cm firm nodule just inferior to mandible on right. Minimally mobile. Non-tender. No obvious connection to thyroid tissue which is normal in character. No other cervical adenopathy appreciated. No axillary adenopathy.   CVS:  RRR  Resp:  Breathing comfortably.   MSK:  No peripheral " edema  Skin:  No rashes  Neuro:  No facial droop. No gross motor/sensory deficits  Psych:  Alert and oriented to person, place, and time    ASSESSMENT & PLAN:     Neck swelling likely 2/2 submandibular gland enlargement. Unclear precipitant, not infectious or inflammatory by history of exam. Possibly precipitated by dental visit. Provided with instruction on symptomatic care with compresses and sour foods. Much less likely 2/2 alternate pathology. Will trial symptomatic treatment and investigate further with US if not improving.     Submandibular gland swelling  Warm compress, sour candy.     Return indications discussed. Discussed with Dr. Alcazar, RTC PRN    Jung Bella MD   Internal Medicine PGY-3

## 2020-02-06 DIAGNOSIS — I10 ESSENTIAL HYPERTENSION: Primary | ICD-10-CM

## 2020-02-06 DIAGNOSIS — Z00.00 ROUTINE GENERAL MEDICAL EXAMINATION AT A HEALTH CARE FACILITY: ICD-10-CM

## 2020-02-10 RX ORDER — HYDROCHLOROTHIAZIDE 25 MG/1
TABLET ORAL
Qty: 90 TABLET | Refills: 0 | OUTPATIENT
Start: 2020-02-10

## 2020-02-10 RX ORDER — VALSARTAN AND HYDROCHLOROTHIAZIDE 320; 25 MG/1; MG/1
1 TABLET, FILM COATED ORAL DAILY
Qty: 90 TABLET | Refills: 1 | Status: SHIPPED | OUTPATIENT
Start: 2020-02-10 | End: 2020-09-19 | Stop reason: SDUPTHER

## 2020-02-10 RX ORDER — VALSARTAN 320 MG/1
TABLET ORAL
Qty: 30 TABLET | Refills: 1 | OUTPATIENT
Start: 2020-02-10

## 2020-02-10 NOTE — TELEPHONE ENCOUNTER
Pharmacy Call Documentation    Pharmacy Called:  [facility] Call Time: 10:10 AM   Spoke with: Rosy 02/10/2020       Called to verify if Valsartan/HCTZ 320-25 MG is in stock. Medication is in stock.     Request will be: pended for review     Additional actions required: no    Current Medication Requested: (refill encounter)   Requested Prescriptions     Pending Prescriptions Disp Refills    valsartan-hydrochlorothiazide (DIOVAN-HCT) 320-25 mg per tablet 90 tablet 1     Sig: Take 1 tablet by mouth once daily.     Refused Prescriptions Disp Refills    valsartan (DIOVAN) 320 MG tablet [Pharmacy Med Name: VALSARTAN 320MG TABLETS] 30 tablet 1     Sig: TAKE 1 TABLET(320 MG) BY MOUTH EVERY DAY     Refused By: IVANA CRUZ     Reason for Refusal: Other (See comments)    hydroCHLOROthiazide (HYDRODIURIL) 25 MG tablet [Pharmacy Med Name: HYDROCHLOROTHIAZIDE 25MG TABLETS] 90 tablet 0     Sig: TAKE 1 TABLET(25 MG) BY MOUTH EVERY DAY     Refused By: IVANA CRUZ     Reason for Refusal: Other (See comments)              [facility] [employer] [id number] [address] AT [place] [address]  [address] [address] [facility] [address]  Phone: [phone] Fax: [phone]      [id number] [address] AT [place] [address]  [address] [facility] [address]  Phone: [phone] Fax: [phone]

## 2020-02-10 NOTE — PROGRESS NOTES
Refill Authorization Note     is requesting a refill authorization.    Brief assessment and rationale for refill: REVIEW: combo Diovan 320-25mg in stock     Medication-related problems identified:   Requires labs  Dose adjustment    Medication Therapy Plan: Pt. previously received valsartan 320mg and hctz 25mg; Pharmacy has the combo in stock; Refuse the components, pended combo, review    Medication reconciliation completed: Yes   Pharmacist Review Requested: Yes                     Comments:   Requested Prescriptions   Signed Prescriptions Disp Refills    valsartan-hydrochlorothiazide (DIOVAN-HCT) 320-25 mg per tablet 90 tablet 1     Sig: Take 1 tablet by mouth once daily.       Cardiovascular: ARB + Diuretic Combos Failed - 2/10/2020 10:43 AM        Failed - K in normal range and within 180 days     Potassium   Date Value Ref Range Status   06/28/2019 3.7 3.5 - 5.1 mmol/L Final   03/03/2018 4.1 3.5 - 5.1 mmol/L Final   06/20/2016 4.0 3.5 - 5.1 mmol/L Final              Failed - Na is between 130 and 148 and within 180 days     Sodium   Date Value Ref Range Status   06/28/2019 141 136 - 145 mmol/L Final   03/03/2018 143 136 - 145 mmol/L Final   06/20/2016 141 136 - 145 mmol/L Final              Failed - Cr is 1.3 or below and within 180 days     Creatinine   Date Value Ref Range Status   06/28/2019 1.0 0.5 - 1.4 mg/dL Final   03/03/2018 0.8 0.5 - 1.4 mg/dL Final   06/20/2016 0.8 0.5 - 1.4 mg/dL Final     POC Creatinine   Date Value Ref Range Status   08/01/2019 0.8 0.5 - 1.4 mg/dL Final              Failed - eGFR within 180 days     eGFR if non    Date Value Ref Range Status   06/28/2019 >60 >60 mL/min/1.73 m^2 Final     Comment:     Calculation used to obtain the estimated glomerular filtration  rate (eGFR) is the CKD-EPI equation.      03/03/2018 >60.0 >60 mL/min/1.73 m^2 Final     Comment:     Calculation used to obtain the estimated glomerular filtration  rate (eGFR) is the CKD-EPI  equation.      06/20/2016 >60.0 >60 mL/min/1.73 m^2 Final     Comment:     Calculation used to obtain the estimated glomerular filtration  rate (eGFR) is the CKD-EPI equation. Since race is unknown   in our information system, the eGFR values for   -American and Non--American patients are given   for each creatinine result.       eGFR if    Date Value Ref Range Status   06/28/2019 >60 >60 mL/min/1.73 m^2 Final   03/03/2018 >60.0 >60 mL/min/1.73 m^2 Final   06/20/2016 >60.0 >60 mL/min/1.73 m^2 Final              Passed - Patient is at least 18 years old        Passed - Last BP in normal range within 360 days.     BP Readings from Last 3 Encounters:   01/20/20 135/88   10/25/19 (!) 158/76   06/28/19 128/86              Passed - Office visit in past 12 months or future 90 days.     Recent Outpatient Visits            3 weeks ago Submandibular gland swelling    [facility] Jung Bella MD    3 months ago Periumbilical abdominal pain    [facility] Janak San MD    7 months ago Routine general medical examination at a health care facility    [facility] Misty Clark MD    1 year ago Routine general medical examination at a health care facility    [facility] Misty Clark MD    3 years ago Cellulitis of right lower leg    [facility] Misty Clark MD          Future Appointments              Tomorrow [id number]                Passed - Ca in normal range and within 360 days     Calcium   Date Value Ref Range Status   06/28/2019 9.3 8.7 - 10.5 mg/dL Final   03/03/2018 9.5 8.7 - 10.5 mg/dL Final   06/20/2016 9.6 8.7 - 10.5 mg/dL Final            Refused Prescriptions Disp Refills    valsartan (DIOVAN) 320 MG tablet [Pharmacy Med Name: VALSARTAN 320MG TABLETS] 30 tablet 1     Sig: TAKE 1 TABLET(320 MG) BY MOUTH EVERY DAY        Cardiovascular:  Angiotensin Receptor Blockers Passed - 2/10/2020 10:43 AM        Passed - Patient is at least 18 years old        Passed - Last BP in normal range within 360 days.     BP Readings from Last 3 Encounters:   01/20/20 135/88   10/25/19 (!) 158/76   06/28/19 128/86              Passed - Office visit in past 12 months or future 90 days.     Recent Outpatient Visits            3 weeks ago Submandibular gland swelling    [facility] Jung Bella MD    3 months ago Periumbilical abdominal pain    [facility] Janak San MD    7 months ago Routine general medical examination at a health care facility    [facility] Misty Clark MD    1 year ago Routine general medical examination at a health care facility    [facility] Misty Clark MD    3 years ago Cellulitis of right lower leg    [facility] Misty Clark MD          Future Appointments              Tomorrow [id number]                Passed - Cr is 1.3 or below and within 360 days     Creatinine   Date Value Ref Range Status   06/28/2019 1.0 0.5 - 1.4 mg/dL Final   03/03/2018 0.8 0.5 - 1.4 mg/dL Final   06/20/2016 0.8 0.5 - 1.4 mg/dL Final     POC Creatinine   Date Value Ref Range Status   08/01/2019 0.8 0.5 - 1.4 mg/dL Final              Passed - K in normal range and within 360 days     Potassium   Date Value Ref Range Status   06/28/2019 3.7 3.5 - 5.1 mmol/L Final   03/03/2018 4.1 3.5 - 5.1 mmol/L Final   06/20/2016 4.0 3.5 - 5.1 mmol/L Final              Passed - eGFR within 360 days     eGFR if non    Date Value Ref Range Status   06/28/2019 >60 >60 mL/min/1.73 m^2 Final     Comment:     Calculation used to obtain the estimated glomerular filtration  rate (eGFR) is the CKD-EPI equation.      03/03/2018 >60.0 >60 mL/min/1.73 m^2 Final     Comment:     Calculation used to obtain the  estimated glomerular filtration  rate (eGFR) is the CKD-EPI equation.      06/20/2016 >60.0 >60 mL/min/1.73 m^2 Final     Comment:     Calculation used to obtain the estimated glomerular filtration  rate (eGFR) is the CKD-EPI equation. Since race is unknown   in our information system, the eGFR values for   -American and Non--American patients are given   for each creatinine result.       eGFR if    Date Value Ref Range Status   06/28/2019 >60 >60 mL/min/1.73 m^2 Final   03/03/2018 >60.0 >60 mL/min/1.73 m^2 Final   06/20/2016 >60.0 >60 mL/min/1.73 m^2 Final             hydroCHLOROthiazide (HYDRODIURIL) 25 MG tablet [Pharmacy Med Name: HYDROCHLOROTHIAZIDE 25MG TABLETS] 90 tablet 0     Sig: TAKE 1 TABLET(25 MG) BY MOUTH EVERY DAY       Cardiovascular: Diuretics - Thiazide Failed - 2/10/2020 10:43 AM        Failed - Cr is 1.3 or below and within 180 days     Creatinine   Date Value Ref Range Status   06/28/2019 1.0 0.5 - 1.4 mg/dL Final   03/03/2018 0.8 0.5 - 1.4 mg/dL Final   06/20/2016 0.8 0.5 - 1.4 mg/dL Final     POC Creatinine   Date Value Ref Range Status   08/01/2019 0.8 0.5 - 1.4 mg/dL Final              Failed - K in normal range and within 180 days     Potassium   Date Value Ref Range Status   06/28/2019 3.7 3.5 - 5.1 mmol/L Final   03/03/2018 4.1 3.5 - 5.1 mmol/L Final   06/20/2016 4.0 3.5 - 5.1 mmol/L Final              Failed - Na is between 130 and 148 and within 180 days     Sodium   Date Value Ref Range Status   06/28/2019 141 136 - 145 mmol/L Final   03/03/2018 143 136 - 145 mmol/L Final   06/20/2016 141 136 - 145 mmol/L Final              Failed - eGFR within 180 days     eGFR if non    Date Value Ref Range Status   06/28/2019 >60 >60 mL/min/1.73 m^2 Final     Comment:     Calculation used to obtain the estimated glomerular filtration  rate (eGFR) is the CKD-EPI equation.      03/03/2018 >60.0 >60 mL/min/1.73 m^2 Final     Comment:     Calculation used to  obtain the estimated glomerular filtration  rate (eGFR) is the CKD-EPI equation.      06/20/2016 >60.0 >60 mL/min/1.73 m^2 Final     Comment:     Calculation used to obtain the estimated glomerular filtration  rate (eGFR) is the CKD-EPI equation. Since race is unknown   in our information system, the eGFR values for   -American and Non--American patients are given   for each creatinine result.       eGFR if    Date Value Ref Range Status   06/28/2019 >60 >60 mL/min/1.73 m^2 Final   03/03/2018 >60.0 >60 mL/min/1.73 m^2 Final   06/20/2016 >60.0 >60 mL/min/1.73 m^2 Final              Passed - Patient is at least 18 years old        Passed - Last BP in normal range within 360 days.     BP Readings from Last 3 Encounters:   01/20/20 135/88   10/25/19 (!) 158/76   06/28/19 128/86              Passed - Office visit in past 12 months or future 90 days.     Recent Outpatient Visits            3 weeks ago Submandibular gland swelling    [facility] Jung Bella MD    3 months ago Periumbilical abdominal pain    [facility] Janak San MD    7 months ago Routine general medical examination at a health care facility    [facility] Misty Clark MD    1 year ago Routine general medical examination at a health care facility    [facility] Misty Clark MD    3 years ago Cellulitis of right lower leg    [facility] Misty Clark MD          Future Appointments              Tomorrow [id number]                Passed - Ca in normal range and within 360 days     Calcium   Date Value Ref Range Status   06/28/2019 9.3 8.7 - 10.5 mg/dL Final   03/03/2018 9.5 8.7 - 10.5 mg/dL Final   06/20/2016 9.6 8.7 - 10.5 mg/dL Final

## 2020-02-10 NOTE — PROGRESS NOTES
Please schedule patient for Labs (Lipids, SCr/K/Na)    Please also check with your provider if any further labs need to be added and scheduled together.    Thanks !

## 2020-02-11 ENCOUNTER — HOSPITAL ENCOUNTER (OUTPATIENT)
Dept: RADIOLOGY | Facility: HOSPITAL | Age: 60
Discharge: HOME | End: 2020-02-11
Attending: INTERNAL MEDICINE
Payer: COMMERCIAL

## 2020-02-11 DIAGNOSIS — R92.8 ABNORMAL MAMMOGRAM: ICD-10-CM

## 2020-02-11 PROCEDURE — 77062 MAMMO DIGITAL DIAGNOSTIC BILAT WITH TOMOSYNTHESIS_CAD: ICD-10-PCS | Mod: 26,,, | Performed by: RADIOLOGY

## 2020-02-11 PROCEDURE — 77062 BREAST TOMOSYNTHESIS BI: CPT | Mod: 26,,, | Performed by: RADIOLOGY

## 2020-02-11 PROCEDURE — 77066 DX MAMMO INCL CAD BI: CPT | Mod: 26,,, | Performed by: RADIOLOGY

## 2020-02-11 PROCEDURE — 77062 BREAST TOMOSYNTHESIS BI: CPT | Mod: TC,PO

## 2020-02-11 PROCEDURE — 77066 MAMMO DIGITAL DIAGNOSTIC BILAT WITH TOMOSYNTHESIS_CAD: ICD-10-PCS | Mod: 26,,, | Performed by: RADIOLOGY

## 2020-09-19 ENCOUNTER — IMMUNIZATION (OUTPATIENT)
Dept: INTERNAL MEDICINE | Facility: CLINIC | Age: 60
End: 2020-09-19
Payer: COMMERCIAL

## 2020-09-19 ENCOUNTER — OFFICE VISIT (OUTPATIENT)
Dept: INTERNAL MEDICINE | Facility: CLINIC | Age: 60
End: 2020-09-19
Payer: COMMERCIAL

## 2020-09-19 VITALS
SYSTOLIC BLOOD PRESSURE: 120 MMHG | HEART RATE: 58 BPM | WEIGHT: 259.13 LBS | OXYGEN SATURATION: 98 % | BODY MASS INDEX: 43.17 KG/M2 | HEIGHT: 65 IN | DIASTOLIC BLOOD PRESSURE: 70 MMHG

## 2020-09-19 DIAGNOSIS — N63.0 BREAST MASS: ICD-10-CM

## 2020-09-19 DIAGNOSIS — I87.2 VENOUS INSUFFICIENCY: ICD-10-CM

## 2020-09-19 DIAGNOSIS — Z20.822 CLOSE EXPOSURE TO COVID-19 VIRUS: ICD-10-CM

## 2020-09-19 DIAGNOSIS — Z00.00 ROUTINE GENERAL MEDICAL EXAMINATION AT A HEALTH CARE FACILITY: Primary | ICD-10-CM

## 2020-09-19 PROCEDURE — 90472 PNEUMOCOCCAL POLYSACCHARIDE VACCINE 23-VALENT =>2YO SQ IM: ICD-10-PCS | Mod: S$GLB,,, | Performed by: INTERNAL MEDICINE

## 2020-09-19 PROCEDURE — 90686 IIV4 VACC NO PRSV 0.5 ML IM: CPT | Mod: S$GLB,,, | Performed by: INTERNAL MEDICINE

## 2020-09-19 PROCEDURE — 90732 PPSV23 VACC 2 YRS+ SUBQ/IM: CPT | Mod: S$GLB,,, | Performed by: INTERNAL MEDICINE

## 2020-09-19 PROCEDURE — 90471 FLU VACCINE (QUAD) GREATER THAN OR EQUAL TO 3YO PRESERVATIVE FREE IM: ICD-10-PCS | Mod: S$GLB,,, | Performed by: INTERNAL MEDICINE

## 2020-09-19 PROCEDURE — 3078F PR MOST RECENT DIASTOLIC BLOOD PRESSURE < 80 MM HG: ICD-10-PCS | Mod: CPTII,S$GLB,, | Performed by: INTERNAL MEDICINE

## 2020-09-19 PROCEDURE — 90472 IMMUNIZATION ADMIN EACH ADD: CPT | Mod: S$GLB,,, | Performed by: INTERNAL MEDICINE

## 2020-09-19 PROCEDURE — 3008F BODY MASS INDEX DOCD: CPT | Mod: CPTII,S$GLB,, | Performed by: INTERNAL MEDICINE

## 2020-09-19 PROCEDURE — 99396 PREV VISIT EST AGE 40-64: CPT | Mod: 25,S$GLB,, | Performed by: INTERNAL MEDICINE

## 2020-09-19 PROCEDURE — 90732 PNEUMOCOCCAL POLYSACCHARIDE VACCINE 23-VALENT =>2YO SQ IM: ICD-10-PCS | Mod: S$GLB,,, | Performed by: INTERNAL MEDICINE

## 2020-09-19 PROCEDURE — 99999 PR PBB SHADOW E&M-EST. PATIENT-LVL III: ICD-10-PCS | Mod: PBBFAC,,, | Performed by: INTERNAL MEDICINE

## 2020-09-19 PROCEDURE — 3078F DIAST BP <80 MM HG: CPT | Mod: CPTII,S$GLB,, | Performed by: INTERNAL MEDICINE

## 2020-09-19 PROCEDURE — 3008F PR BODY MASS INDEX (BMI) DOCUMENTED: ICD-10-PCS | Mod: CPTII,S$GLB,, | Performed by: INTERNAL MEDICINE

## 2020-09-19 PROCEDURE — 99999 PR PBB SHADOW E&M-EST. PATIENT-LVL III: CPT | Mod: PBBFAC,,, | Performed by: INTERNAL MEDICINE

## 2020-09-19 PROCEDURE — 3074F SYST BP LT 130 MM HG: CPT | Mod: CPTII,S$GLB,, | Performed by: INTERNAL MEDICINE

## 2020-09-19 PROCEDURE — 99396 PR PREVENTIVE VISIT,EST,40-64: ICD-10-PCS | Mod: 25,S$GLB,, | Performed by: INTERNAL MEDICINE

## 2020-09-19 PROCEDURE — 3074F PR MOST RECENT SYSTOLIC BLOOD PRESSURE < 130 MM HG: ICD-10-PCS | Mod: CPTII,S$GLB,, | Performed by: INTERNAL MEDICINE

## 2020-09-19 PROCEDURE — 90471 IMMUNIZATION ADMIN: CPT | Mod: S$GLB,,, | Performed by: INTERNAL MEDICINE

## 2020-09-19 PROCEDURE — 90686 FLU VACCINE (QUAD) GREATER THAN OR EQUAL TO 3YO PRESERVATIVE FREE IM: ICD-10-PCS | Mod: S$GLB,,, | Performed by: INTERNAL MEDICINE

## 2020-09-19 RX ORDER — DOXYCYCLINE 100 MG/1
100 CAPSULE ORAL 2 TIMES DAILY
Qty: 20 CAPSULE | Refills: 2 | Status: SHIPPED | OUTPATIENT
Start: 2020-09-19 | End: 2021-10-28 | Stop reason: SDUPTHER

## 2020-09-19 RX ORDER — VALSARTAN AND HYDROCHLOROTHIAZIDE 320; 25 MG/1; MG/1
1 TABLET, FILM COATED ORAL DAILY
Qty: 90 TABLET | Refills: 3 | Status: SHIPPED | OUTPATIENT
Start: 2020-09-19 | End: 2020-12-22 | Stop reason: SDUPTHER

## 2020-09-19 NOTE — PROGRESS NOTES
CHIEF COMPLAINT: Annual exam    HISTORY OF PRESENT ILLNESS: This is a 60-year-old woman who presents for her annual exam     She has not been using her compression stockings lately. No fever, chills, nausea, vomiting, constipation, diarrhea. She has occasional irritation and redness in the lower legs and she starts on doxycycline right away takes care of the problem. NO cellulitis recently.      She had a MI/BSO 13 which was complicated by a partial small bowel obstruction and left subclavian DVT. She completed her 3 months of Xarelto. Ultrasound of left upper extremity was fine 2013. NO left arm pain or swelling.       She continues to take Hyzar 100/25 once daily for hypertension. NO chest pain or shortness of breath. She is taking vitamin D supplement 5000 units daily for her vitamin D deficiency.      She had a breast biopsy 3/12/18 -pathology was benign - angiolipoma - MMG 20 was stable. She want to see a breast surgeon to discuss the posiblity of removal. She has pain in the left breast at times. .           PAST MEDICAL HISTORY:   1. Hypertension  2. History of cellulitis of the right lower extremity 2008.   3. Venous insufficiency   4. Mild vitamin D deficiency.   5. Obesity.    6. Left subclavian DVT 2013 - postoperatively    7. Partial small bowel obstruction after MI/BSO 13    PAST SURGICAL HISTORY:   1. Appendectomy in 2002 with complication of what sounds like small bowel obstruction.   2. Umbilical hernia repair in .   3. D&C 2008 secondary to irregular menstrual periods.    4. MI/BSO and small bowel resection with anastamosis 2013    SOCIAL HISTORY: She quit smoking in , smoked less than a pack of cigarettes daily for 8 years, drinks alcohol once 1-2 times a month. Single, no children. She is an . Now  of [place].     FAMILY HISTORY: Mother is living with rheumatoid arthritis. Father  of heart problems. She has a sister and  three brothers who are healthy. One brother was recently diagnosed with FSGS. Brother  of complications of drugs     REVIEW OF SYSTEMS: She denies fevers, chills, night sweats, fatigue, visual change, hearing loss, sinus congestion, sore throat, chest pain, shortness of breath, nausea, vomiting, constipation, diarrhea, dysuria, hematuria, polydipsia, polyuria, joint pain, muscle pain, headaches, anxiety, depression, insomnia.       PHYSICAL EXAMINATION:        General: Alert, oriented. No apparent distress. Affect within normal limits.   Conjunctivae anicteric. Tympanic membranes clear. Oropharynx clear.   Neck supple. No cervical lymphadenopathy, no thyroid enlargement.   Respiratory effort normal. Lungs clear to auscultation.   Heart: Regular rate and rhythm without murmurs, gallops or rubs.   ABDOMEN: soft, non distended, non tender, bowel sounds present, no hepatosplenomgaly    ASSESSMENT AND PLAN:       1. Annual exam - discussed diet, exercise and safety issues.  2. Venous insufficiency - compression stockings  3. Morbid obesity - work on diet and exercise  4. S/P MI/BSO - doing well - no menopausal symptoms    5. Partial small bowel obstruction - doing well    6.  Left subclavian thrombosis - resolved - completed 3 months of xarelto    7. Hypertension - stable    8. Vitamin D deficency - vitamin D 5,000 units daily  9. Angiolipoma left breast - to breast surgeon for eval for removal  Screening - colonoscopy 14 due 10 years. Mammogram . Bone density was normal 2014     I'll see her back in 12 months , sooner if problems arise.    [employer]

## 2020-09-19 NOTE — PROGRESS NOTES
Administered pneumo 23 to the left deltoid and flu vaccine to the right deltoid, tolerated well, no adverse reaction noted within wait period.

## 2020-09-26 ENCOUNTER — LAB VISIT (OUTPATIENT)
Dept: LAB | Facility: HOSPITAL | Age: 60
End: 2020-09-26
Attending: INTERNAL MEDICINE
Payer: COMMERCIAL

## 2020-09-26 DIAGNOSIS — Z00.00 ROUTINE GENERAL MEDICAL EXAMINATION AT A HEALTH CARE FACILITY: ICD-10-CM

## 2020-09-26 DIAGNOSIS — Z20.822 CLOSE EXPOSURE TO COVID-19 VIRUS: ICD-10-CM

## 2020-09-26 LAB
25(OH)D3+25(OH)D2 SERPL-MCNC: 41 NG/ML (ref 30–96)
ALBUMIN SERPL BCP-MCNC: 3.7 G/DL (ref 3.5–5.2)
ALP SERPL-CCNC: 92 U/L (ref 55–135)
ALT SERPL W/O P-5'-P-CCNC: 26 U/L (ref 10–44)
ANION GAP SERPL CALC-SCNC: 11 MMOL/L (ref 8–16)
AST SERPL-CCNC: 21 U/L (ref 10–40)
BASOPHILS # BLD AUTO: 0.04 K/UL (ref 0–0.2)
BASOPHILS NFR BLD: 0.6 % (ref 0–1.9)
BILIRUB SERPL-MCNC: 0.6 MG/DL (ref 0.1–1)
BUN SERPL-MCNC: 16 MG/DL (ref 8–23)
CALCIUM SERPL-MCNC: 9 MG/DL (ref 8.7–10.5)
CHLORIDE SERPL-SCNC: 108 MMOL/L (ref 95–110)
CHOLEST SERPL-MCNC: 152 MG/DL (ref 120–199)
CHOLEST/HDLC SERPL: 3.6 {RATIO} (ref 2–5)
CO2 SERPL-SCNC: 23 MMOL/L (ref 23–29)
CREAT SERPL-MCNC: 0.8 MG/DL (ref 0.5–1.4)
DIFFERENTIAL METHOD: ABNORMAL
EOSINOPHIL # BLD AUTO: 0.1 K/UL (ref 0–0.5)
EOSINOPHIL NFR BLD: 1.6 % (ref 0–8)
ERYTHROCYTE [DISTWIDTH] IN BLOOD BY AUTOMATED COUNT: 13.1 % (ref 11.5–14.5)
EST. GFR  (AFRICAN AMERICAN): >60 ML/MIN/1.73 M^2
EST. GFR  (NON AFRICAN AMERICAN): >60 ML/MIN/1.73 M^2
GLUCOSE SERPL-MCNC: 93 MG/DL (ref 70–110)
HCT VFR BLD AUTO: 38.7 % (ref 37–48.5)
HDLC SERPL-MCNC: 42 MG/DL (ref 40–75)
HDLC SERPL: 27.6 % (ref 20–50)
HGB BLD-MCNC: 12.9 G/DL (ref 12–16)
IMM GRANULOCYTES # BLD AUTO: 0.02 K/UL (ref 0–0.04)
IMM GRANULOCYTES NFR BLD AUTO: 0.3 % (ref 0–0.5)
LDLC SERPL CALC-MCNC: 98.6 MG/DL (ref 63–159)
LYMPHOCYTES # BLD AUTO: 2.9 K/UL (ref 1–4.8)
LYMPHOCYTES NFR BLD: 45 % (ref 18–48)
MCH RBC QN AUTO: 31.5 PG (ref 27–31)
MCHC RBC AUTO-ENTMCNC: 33.3 G/DL (ref 32–36)
MCV RBC AUTO: 94 FL (ref 82–98)
MONOCYTES # BLD AUTO: 0.4 K/UL (ref 0.3–1)
MONOCYTES NFR BLD: 6.1 % (ref 4–15)
NEUTROPHILS # BLD AUTO: 3 K/UL (ref 1.8–7.7)
NEUTROPHILS NFR BLD: 46.4 % (ref 38–73)
NONHDLC SERPL-MCNC: 110 MG/DL
NRBC BLD-RTO: 0 /100 WBC
PLATELET # BLD AUTO: 155 K/UL (ref 150–350)
PMV BLD AUTO: 11.7 FL (ref 9.2–12.9)
POTASSIUM SERPL-SCNC: 3.7 MMOL/L (ref 3.5–5.1)
PROT SERPL-MCNC: 7.1 G/DL (ref 6–8.4)
RBC # BLD AUTO: 4.1 M/UL (ref 4–5.4)
SARS-COV-2 IGG SERPLBLD QL IA.RAPID: NEGATIVE
SODIUM SERPL-SCNC: 142 MMOL/L (ref 136–145)
TRIGL SERPL-MCNC: 57 MG/DL (ref 30–150)
TSH SERPL DL<=0.005 MIU/L-ACNC: 1.04 UIU/ML (ref 0.4–4)
WBC # BLD AUTO: 6.42 K/UL (ref 3.9–12.7)

## 2020-09-26 PROCEDURE — 80061 LIPID PANEL: CPT

## 2020-09-26 PROCEDURE — 36415 COLL VENOUS BLD VENIPUNCTURE: CPT

## 2020-09-26 PROCEDURE — 80053 COMPREHEN METABOLIC PANEL: CPT

## 2020-09-26 PROCEDURE — 85025 COMPLETE CBC W/AUTO DIFF WBC: CPT

## 2020-09-26 PROCEDURE — 86769 SARS-COV-2 COVID-19 ANTIBODY: CPT

## 2020-09-26 PROCEDURE — 82306 VITAMIN D 25 HYDROXY: CPT

## 2020-09-26 PROCEDURE — 84443 ASSAY THYROID STIM HORMONE: CPT

## 2020-11-09 ENCOUNTER — PATIENT OUTREACH (OUTPATIENT)
Dept: ADMINISTRATIVE | Facility: OTHER | Age: 60
End: 2020-11-09

## 2020-11-09 DIAGNOSIS — Z12.11 COLON CANCER SCREENING: Primary | ICD-10-CM

## 2020-11-09 NOTE — PROGRESS NOTES
History and Physical  [facility]  Department of Surgery    CHIEF COMPLAINT: Angiolipoma of left breast    REFERRING:  Misty Clark MD  [address] [place] [address]  Misty Clark MD      Subjective:      Marita Gee is a 60 y.o. postmenopausal female referred for evaluation of a breast mass. Change was noted 2 years ago on a routine screening mammogram. Folow up imaging showed a 0.8 cm mass in the left upper chest at 12 o'clock. Biopsy of this lesion on 3/12/2018 showed Angiolipoma with no evidence of atypia or malignancy. Patient does routinely do self breast exams. Patient has noted a change on breast exam. She states that over the past year she believes that it has gotten slightly bigger, and that it has become more uncomfortable. Patient denies nipple discharge. Patient admits to to previous breast biopsy 30 years ago in the same breast, but the results were benign. Patient denies a personal history of breast cancer.    GYN History:  Age of menarche was 11. Age of menopause was 55.  Last menstrual period was age 55 after total hysterectomy BSO. Patient denies hormonal therapy. Patient is .    FAMILY history:  No family history of cancer    Past Medical History:   Diagnosis Date    Breast cyst     History of cellulitis      Right lower extremity    HTN (hypertension) 2012    Obesity 2012    Partial small bowel obstruction 2013    after MI/BS)    Subclavian artery thrombosis 2013    left - post op    Venous insufficiency 2012    Vitamin D deficiency disease 2012     Past Surgical History:   Procedure Laterality Date    APPENDECTOMY      small bowel obstruction after surgery    bowel obstruction      secondary to appendectomy    BREAST BIOPSY Left 2018    core    COLONOSCOPY      DILATION AND CURETTAGE OF UTERUS  2008    irregular peroids    HYSTERECTOMY  2013    An attempted robotic hysterectomy, lysis of adhesions for  over 2  hours and then total abdominal hysterectomy, bilateral salpingo-oophorectomy andDr. Barksdale, General Surgeon, was present for a small bowel resection as well as  lysis of adhesions     TOTAL ABDOMINAL HYSTERECTOMY W/ BILATERAL SALPINGOOPHORECTOMY  2013    with small bowel resection with anastamosis    UMBILICAL HERNIA REPAIR       Current Outpatient Medications on File Prior to Visit   Medication Sig Dispense Refill    cholecalciferol, vitamin D3, (VITAMIN D3) 5,000 unit Tab Take 5,000 Units by mouth once daily.      doxycycline (MONODOX) 100 MG capsule Take 1 capsule (100 mg total) by mouth 2 (two) times daily. 20 capsule 2    furosemide (LASIX) 20 MG tablet Take 1 tablet (20 mg total) by mouth daily as needed. 90 tablet 4    multivitamin capsule Take 1 capsule by mouth once daily.      valsartan-hydrochlorothiazide (DIOVAN-HCT) 320-25 mg per tablet Take 1 tablet by mouth once daily. 90 tablet 3     No current facility-administered medications on file prior to visit.      Social History     Socioeconomic History    Marital status: Single     Spouse name: Not on file    Number of children: Not on file    Years of education: Not on file    Highest education level: Not on file   Occupational History    Not on file   Social Needs    Financial resource strain: Not on file    Food insecurity     Worry: Not on file     Inability: Not on file    Transportation needs     Medical: Not on file     Non-medical: Not on file   Tobacco Use    Smoking status: Former Smoker     Packs/day: 0.50     Years: 12.00     Pack years: 6.00     Quit date: 2002     Years since quittin.7    Smokeless tobacco: Never Used    Tobacco comment: Quit , less than a pack per day for 8 years   Substance and Sexual Activity    Alcohol use: Yes     Comment: once a week    Drug use: No    Sexual activity: Never   Lifestyle    Physical activity     Days per week: 2 days     Minutes per session: 100 min     Stress: Not at all   Relationships    Social connections     Talks on phone: Not on file     Gets together: Not on file     Attends [employer] service: Not on file     Active member of club or organization: Not on file     Attends meetings of clubs or organizations: Not on file     Relationship status: Not on file   Other Topics Concern    Not on file   Social History Narrative    Not on file   She is a   Family History   Problem Relation Age of Onset    Hypertension Mother     Cataracts Mother     Cancer Paternal Uncle         Lung Cancer    No Known Problems Father     No Known Problems Brother     No Known Problems Brother     No Known Problems Sister     No Known Problems Brother     No Known Problems Maternal Aunt     No Known Problems Maternal Uncle     No Known Problems Paternal Aunt     No Known Problems Maternal Grandmother     No Known Problems Maternal Grandfather     No Known Problems Paternal Grandmother     No Known Problems Paternal Grandfather     Amblyopia Neg Hx     Blindness Neg Hx     Diabetes Neg Hx     Glaucoma Neg Hx     Macular degeneration Neg Hx     Retinal detachment Neg Hx     Strabismus Neg Hx     Stroke Neg Hx     Thyroid disease Neg Hx     Colon cancer Neg Hx     Esophageal cancer Neg Hx        Review of Systems  Pertinent items are noted in HPI.       Objective:        BP (!) 168/79 (BP Location: Left arm, Patient Position: Sitting, BP Method: X-Large (Automatic))   Pulse 75   Temp 98 °F (36.7 °C) (Oral)   Wt 117.9 kg (260 lb)   LMP 08/21/2013   BMI 43.27 kg/m²   General appearance: alert, appears stated age and cooperative  Head: Normocephalic, without obvious abnormality, atraumatic  Neck: no adenopathy and supple, symmetrical, trachea midline  Lungs: normal effort, nonlabored breathing  Breasts: No nipple retraction or dimpling, No nipple discharge or bleeding, No axillary or supraclavicular adenopathy, positive findings: 1.5 cm palpable,  superficial, mobile mass that is compressible at 12OC 12CFN nearly out of the breast tissue superiorly  Heart: regular rate and rhythm  Abdomen: soft, non-tender; bowel sounds normal; no masses,  no organomegaly  Extremities: extremities normal, atraumatic, no cyanosis or edema  Skin: normal, no edema and no lesions noted  Lymph nodes: Cervical, supraclavicular, and axillary nodes normal.  Neurologic: Grossly normal    Radiology review: Images personally reviewed by me in the clinic.  Diagnostic Mammogram 2/11/2020    Result:   Mammo Digital Diagnostic Bilat w/ Yasmani     History:  Patient is 59 y.o. and is seen for a diagnostic mammogram.     Films Compared:  Compared to: 02/06/2019 Mammo Digital Diagnostic Bilat w/ Yasmani, 09/24/2018 Mammo Digital Diagnostic Left with Tomosynthesis_CAD, 02/23/2018 Mammo Digital Diagnostic Bilat with Tomosynthesis_CAD, and 02/21/2017 Mammo Digital Screening Bilat with Tomosynthesis_CAD     Findings:  This procedure was performed using tomosynthesis.  Computer-aided detection was utilized in the interpretation of this examination.  The breasts are almost entirely fatty.      Left  There is an 8 mm x 4 mm x 8 mm mass with indistinct margins seen in the left breast at 12 o'clock, 12 cm from the nipple. This biopsy proven  Angiolipoma has not changed in size from the biopsy.  The biopsy marker is seen just outside of the area of interest.      Right  There is no evidence of suspicious masses, calcifications, or other abnormal findings in the right breast.     Impression:  Bilateral  There is no mammographic evidence of malignancy.     BI-RADS Category:   Overall: 2 - Benign      Assessment:      Marita Gee is a 60 y.o. postmenopausal female with angiolipoma of the left breast.     Plan:   We discussed the options for management of angiolipoma.  Low risk of any malignancy.    Ultrasound assessment of breast mass today in clinic identified the lesion of interest just beneath the skin  surface with the clip identified    Will need pre-op labwork/EKG    Consented obtained for ultrasound-guided excisional biopsy of left breast      45 minutes were spent on this encounter, 30 of which was face to face counseling and 15 minutes were spent on chart review and coordination of care.

## 2020-11-09 NOTE — PROGRESS NOTES
LINKS immunization registry not responding  Care Everywhere updated  Health Maintenance updated  Chart reviewed for overdue Proactive [facility] Encounters (FABIOLA) health maintenance testing (CRS, Breast Ca, Diabetic Eye Exam)   Orders entered: fit kit

## 2020-11-10 ENCOUNTER — OFFICE VISIT (OUTPATIENT)
Dept: SURGERY | Facility: CLINIC | Age: 60
End: 2020-11-10
Payer: COMMERCIAL

## 2020-11-10 VITALS
SYSTOLIC BLOOD PRESSURE: 168 MMHG | TEMPERATURE: 98 F | BODY MASS INDEX: 43.27 KG/M2 | DIASTOLIC BLOOD PRESSURE: 79 MMHG | WEIGHT: 260 LBS | HEART RATE: 75 BPM

## 2020-11-10 DIAGNOSIS — D17.9 ANGIOLIPOMA: Primary | ICD-10-CM

## 2020-11-10 DIAGNOSIS — Z01.818 PRE-OP TESTING: ICD-10-CM

## 2020-11-10 DIAGNOSIS — N63.0 BREAST MASS: ICD-10-CM

## 2020-11-10 PROCEDURE — 99204 PR OFFICE/OUTPT VISIT, NEW, LEVL IV, 45-59 MIN: ICD-10-PCS | Mod: S$GLB,,, | Performed by: SURGERY

## 2020-11-10 PROCEDURE — 3077F PR MOST RECENT SYSTOLIC BLOOD PRESSURE >= 140 MM HG: ICD-10-PCS | Mod: CPTII,S$GLB,, | Performed by: SURGERY

## 2020-11-10 PROCEDURE — 3078F DIAST BP <80 MM HG: CPT | Mod: CPTII,S$GLB,, | Performed by: SURGERY

## 2020-11-10 PROCEDURE — 99999 PR PBB SHADOW E&M-EST. PATIENT-LVL III: ICD-10-PCS | Mod: PBBFAC,,, | Performed by: SURGERY

## 2020-11-10 PROCEDURE — 99204 OFFICE O/P NEW MOD 45 MIN: CPT | Mod: S$GLB,,, | Performed by: SURGERY

## 2020-11-10 PROCEDURE — 1126F AMNT PAIN NOTED NONE PRSNT: CPT | Mod: S$GLB,,, | Performed by: SURGERY

## 2020-11-10 PROCEDURE — 3008F BODY MASS INDEX DOCD: CPT | Mod: CPTII,S$GLB,, | Performed by: SURGERY

## 2020-11-10 PROCEDURE — 3077F SYST BP >= 140 MM HG: CPT | Mod: CPTII,S$GLB,, | Performed by: SURGERY

## 2020-11-10 PROCEDURE — 3008F PR BODY MASS INDEX (BMI) DOCUMENTED: ICD-10-PCS | Mod: CPTII,S$GLB,, | Performed by: SURGERY

## 2020-11-10 PROCEDURE — 3078F PR MOST RECENT DIASTOLIC BLOOD PRESSURE < 80 MM HG: ICD-10-PCS | Mod: CPTII,S$GLB,, | Performed by: SURGERY

## 2020-11-10 PROCEDURE — 99999 PR PBB SHADOW E&M-EST. PATIENT-LVL III: CPT | Mod: PBBFAC,,, | Performed by: SURGERY

## 2020-11-10 PROCEDURE — 1126F PR PAIN SEVERITY QUANTIFIED, NO PAIN PRESENT: ICD-10-PCS | Mod: S$GLB,,, | Performed by: SURGERY

## 2020-11-10 NOTE — LETTER
November 13, 2020      Misty Clark MD  [address]  [facility] [address]           [facility] entry  [address]  [facility] [address]  Phone: [phone]  Fax: [phone]          Patient: Marita Gee   MR Number: [id number]   YOB: 1960   Date of Visit: 11/10/2020       Dear Dr. Misty Clark:    Thank you for referring Marita Gee to me for evaluation. Attached you will find relevant portions of my assessment and plan of care.    If you have questions, please do not hesitate to call me. I look forward to following Marita Gee along with you.    Sincerely,    Isabelle Sy MD    Enclosure  CC:  No Recipients    If you would like to receive this communication electronically, please contact [email or web address] or [phone] to request more information on z[employer] Link access.    For providers and/or their staff who would like to refer a patient to [facility], please contact us through our one-stop-shop provider referral line, [facility], at [phone].    If you feel you have received this communication in error or would no longer like to receive these types of communications, please e-mail [email or web address]

## 2020-11-10 NOTE — Clinical Note
Jeff Jay,    Ms. Gee has an angiolipoma of the left breast which is very low risk for malignancy but it may continue to grow.  She has opted to have it removed and at her convenience has scheduled this for January.    Thanks for sending her!  Isabelle Sy MD  Breast Surgical Oncology

## 2020-11-11 DIAGNOSIS — D17.9 ANGIOLIPOMA: Primary | ICD-10-CM

## 2020-11-13 PROBLEM — D17.9 ANGIOLIPOMA: Status: ACTIVE | Noted: 2020-11-13

## 2020-12-17 NOTE — TELEPHONE ENCOUNTER
[facility] mamm[facility] order, pt is c/o of breast discomfort and I knot near the area of previous biopsy.    Mom called and notified of low Vit D result  From ER visit on 12/13. Mom educated to make sure patient is taking vit d weekly as prescribed by Dr Kerry Bull. RN also followed up on pt's rash from [facility]. Mom states that rash is still all over the body and red. Rash has spread to palms of pt's hands, but mom denies any other symptoms such as peeling, blistering, cough, fever, or congestion. Mom educated to update us if rash worsens and to go to ED. Will follow up with mom next week regarding rash. Mom denied further questions or concerns @ this time.

## 2020-12-22 RX ORDER — VALSARTAN AND HYDROCHLOROTHIAZIDE 320; 25 MG/1; MG/1
1 TABLET, FILM COATED ORAL DAILY
Qty: 90 TABLET | Refills: 3 | Status: SHIPPED | OUTPATIENT
Start: 2020-12-22 | End: 2021-10-28 | Stop reason: SDUPTHER

## 2020-12-22 NOTE — TELEPHONE ENCOUNTER
No new care gaps identified.  Powered by [employer]erved. Reference number: [id number]. 12/22/2020 11:47:33 AM   CST

## 2021-01-04 ENCOUNTER — ANESTHESIA EVENT (OUTPATIENT)
Dept: SURGERY | Facility: HOSPITAL | Age: 61
End: 2021-01-04
Payer: COMMERCIAL

## 2021-01-04 ENCOUNTER — PATIENT MESSAGE (OUTPATIENT)
Dept: ADMINISTRATIVE | Facility: HOSPITAL | Age: 61
End: 2021-01-04

## 2021-01-11 ENCOUNTER — LAB VISIT (OUTPATIENT)
Dept: INTERNAL MEDICINE | Facility: CLINIC | Age: 61
End: 2021-01-11
Payer: COMMERCIAL

## 2021-01-11 ENCOUNTER — TELEPHONE (OUTPATIENT)
Dept: PLASTIC SURGERY | Facility: CLINIC | Age: 61
End: 2021-01-11

## 2021-01-11 ENCOUNTER — HOSPITAL ENCOUNTER (OUTPATIENT)
Dept: CARDIOLOGY | Facility: CLINIC | Age: 61
Discharge: HOME | End: 2021-01-11
Payer: COMMERCIAL

## 2021-01-11 ENCOUNTER — LAB VISIT (OUTPATIENT)
Dept: LAB | Facility: HOSPITAL | Age: 61
End: 2021-01-11
Attending: SURGERY
Payer: COMMERCIAL

## 2021-01-11 DIAGNOSIS — Z01.818 PRE-OP TESTING: ICD-10-CM

## 2021-01-11 LAB
ALBUMIN SERPL BCP-MCNC: 3.9 G/DL (ref 3.5–5.2)
ALP SERPL-CCNC: 107 U/L (ref 55–135)
ALT SERPL W/O P-5'-P-CCNC: 28 U/L (ref 10–44)
ANION GAP SERPL CALC-SCNC: 8 MMOL/L (ref 8–16)
AST SERPL-CCNC: 24 U/L (ref 10–40)
BASOPHILS # BLD AUTO: 0.05 K/UL (ref 0–0.2)
BASOPHILS NFR BLD: 0.6 % (ref 0–1.9)
BILIRUB SERPL-MCNC: 0.5 MG/DL (ref 0.1–1)
BUN SERPL-MCNC: 20 MG/DL (ref 8–23)
CALCIUM SERPL-MCNC: 9.1 MG/DL (ref 8.7–10.5)
CHLORIDE SERPL-SCNC: 102 MMOL/L (ref 95–110)
CO2 SERPL-SCNC: 29 MMOL/L (ref 23–29)
CREAT SERPL-MCNC: 1 MG/DL (ref 0.5–1.4)
DIFFERENTIAL METHOD: ABNORMAL
EOSINOPHIL # BLD AUTO: 0 K/UL (ref 0–0.5)
EOSINOPHIL NFR BLD: 0.5 % (ref 0–8)
ERYTHROCYTE [DISTWIDTH] IN BLOOD BY AUTOMATED COUNT: 13.1 % (ref 11.5–14.5)
EST. GFR  (AFRICAN AMERICAN): >60 ML/MIN/1.73 M^2
EST. GFR  (NON AFRICAN AMERICAN): >60 ML/MIN/1.73 M^2
GLUCOSE SERPL-MCNC: 111 MG/DL (ref 70–110)
HCT VFR BLD AUTO: 43.2 % (ref 37–48.5)
HGB BLD-MCNC: 14.4 G/DL (ref 12–16)
IMM GRANULOCYTES # BLD AUTO: 0.02 K/UL (ref 0–0.04)
IMM GRANULOCYTES NFR BLD AUTO: 0.2 % (ref 0–0.5)
LYMPHOCYTES # BLD AUTO: 3.2 K/UL (ref 1–4.8)
LYMPHOCYTES NFR BLD: 36.8 % (ref 18–48)
MCH RBC QN AUTO: 31.9 PG (ref 27–31)
MCHC RBC AUTO-ENTMCNC: 33.3 G/DL (ref 32–36)
MCV RBC AUTO: 96 FL (ref 82–98)
MONOCYTES # BLD AUTO: 0.7 K/UL (ref 0.3–1)
MONOCYTES NFR BLD: 8.1 % (ref 4–15)
NEUTROPHILS # BLD AUTO: 4.7 K/UL (ref 1.8–7.7)
NEUTROPHILS NFR BLD: 53.8 % (ref 38–73)
NRBC BLD-RTO: 0 /100 WBC
PLATELET # BLD AUTO: 182 K/UL (ref 150–350)
PMV BLD AUTO: 10.8 FL (ref 9.2–12.9)
POTASSIUM SERPL-SCNC: 3.8 MMOL/L (ref 3.5–5.1)
PROT SERPL-MCNC: 7.5 G/DL (ref 6–8.4)
RBC # BLD AUTO: 4.51 M/UL (ref 4–5.4)
SODIUM SERPL-SCNC: 139 MMOL/L (ref 136–145)
WBC # BLD AUTO: 8.75 K/UL (ref 3.9–12.7)

## 2021-01-11 PROCEDURE — 93005 ELECTROCARDIOGRAM TRACING: CPT | Mod: S$GLB,,, | Performed by: SURGERY

## 2021-01-11 PROCEDURE — 80053 COMPREHEN METABOLIC PANEL: CPT

## 2021-01-11 PROCEDURE — U0003 INFECTIOUS AGENT DETECTION BY NUCLEIC ACID (DNA OR RNA); SEVERE ACUTE RESPIRATORY SYNDROME CORONAVIRUS 2 (SARS-COV-2) (CORONAVIRUS DISEASE [COVID-19]), AMPLIFIED PROBE TECHNIQUE, MAKING USE OF HIGH THROUGHPUT TECHNOLOGIES AS DESCRIBED BY CMS-2020-01-R: HCPCS

## 2021-01-11 PROCEDURE — 85025 COMPLETE CBC W/AUTO DIFF WBC: CPT

## 2021-01-11 PROCEDURE — 93005 EKG 12-LEAD: ICD-10-PCS | Mod: S$GLB,,, | Performed by: SURGERY

## 2021-01-11 PROCEDURE — 93010 EKG 12-LEAD: ICD-10-PCS | Mod: S$GLB,,, | Performed by: INTERNAL MEDICINE

## 2021-01-11 PROCEDURE — 93010 ELECTROCARDIOGRAM REPORT: CPT | Mod: S$GLB,,, | Performed by: INTERNAL MEDICINE

## 2021-01-11 PROCEDURE — 36415 COLL VENOUS BLD VENIPUNCTURE: CPT

## 2021-01-12 LAB — SARS-COV-2 RNA RESP QL NAA+PROBE: NOT DETECTED

## 2021-01-13 ENCOUNTER — TELEPHONE (OUTPATIENT)
Dept: SURGERY | Facility: CLINIC | Age: 61
End: 2021-01-13

## 2021-01-13 RX ORDER — HYDROCODONE BITARTRATE AND ACETAMINOPHEN 5; 325 MG/1; MG/1
1 TABLET ORAL EVERY 6 HOURS PRN
Qty: 12 TABLET | Refills: 0 | Status: SHIPPED | OUTPATIENT
Start: 2021-01-13 | End: 2021-10-28

## 2021-01-14 ENCOUNTER — HOSPITAL ENCOUNTER (OUTPATIENT)
Dept: RADIOLOGY | Facility: HOSPITAL | Age: 61
Discharge: HOME | End: 2021-01-14
Attending: SURGERY | Admitting: SURGERY
Payer: COMMERCIAL

## 2021-01-14 ENCOUNTER — ANESTHESIA (OUTPATIENT)
Dept: SURGERY | Facility: HOSPITAL | Age: 61
End: 2021-01-14
Payer: COMMERCIAL

## 2021-01-14 ENCOUNTER — HOSPITAL ENCOUNTER (OUTPATIENT)
Facility: HOSPITAL | Age: 61
Discharge: HOME | End: 2021-01-14
Attending: SURGERY | Admitting: SURGERY
Payer: COMMERCIAL

## 2021-01-14 VITALS
HEART RATE: 62 BPM | OXYGEN SATURATION: 98 % | TEMPERATURE: 98 F | BODY MASS INDEX: 43.32 KG/M2 | WEIGHT: 260 LBS | HEIGHT: 65 IN | DIASTOLIC BLOOD PRESSURE: 72 MMHG | SYSTOLIC BLOOD PRESSURE: 149 MMHG | RESPIRATION RATE: 16 BRPM

## 2021-01-14 DIAGNOSIS — D17.9 ANGIOLIPOMA: ICD-10-CM

## 2021-01-14 DIAGNOSIS — D17.9 ANGIOLIPOMA: Primary | ICD-10-CM

## 2021-01-14 PROCEDURE — 36000706: Performed by: SURGERY

## 2021-01-14 PROCEDURE — 19301 PR MASTECTOMY, PARTIAL: ICD-10-PCS | Mod: LT,,, | Performed by: SURGERY

## 2021-01-14 PROCEDURE — 25000003 PHARM REV CODE 250: Performed by: STUDENT IN AN ORGANIZED HEALTH CARE EDUCATION/TRAINING PROGRAM

## 2021-01-14 PROCEDURE — 88307 TISSUE EXAM BY PATHOLOGIST: CPT | Performed by: PATHOLOGY

## 2021-01-14 PROCEDURE — 99900035 HC TECH TIME PER 15 MIN (STAT)

## 2021-01-14 PROCEDURE — 37000009 HC ANESTHESIA EA ADD 15 MINS: Performed by: SURGERY

## 2021-01-14 PROCEDURE — D9220A PRA ANESTHESIA: Mod: ANES,,, | Performed by: ANESTHESIOLOGY

## 2021-01-14 PROCEDURE — 36000707: Performed by: SURGERY

## 2021-01-14 PROCEDURE — 25000003 PHARM REV CODE 250: Performed by: NURSE ANESTHETIST, CERTIFIED REGISTERED

## 2021-01-14 PROCEDURE — 37000008 HC ANESTHESIA 1ST 15 MINUTES: Performed by: SURGERY

## 2021-01-14 PROCEDURE — 88307 TISSUE EXAM BY PATHOLOGIST: CPT | Mod: 26,,, | Performed by: PATHOLOGY

## 2021-01-14 PROCEDURE — 71000033 HC RECOVERY, INTIAL HOUR: Performed by: SURGERY

## 2021-01-14 PROCEDURE — 19301 PARTIAL MASTECTOMY: CPT | Mod: LT,,, | Performed by: SURGERY

## 2021-01-14 PROCEDURE — D9220A PRA ANESTHESIA: ICD-10-PCS | Mod: ANES,,, | Performed by: ANESTHESIOLOGY

## 2021-01-14 PROCEDURE — 88307 PR  SURG PATH,LEVEL V: ICD-10-PCS | Mod: 26,,, | Performed by: PATHOLOGY

## 2021-01-14 PROCEDURE — D9220A PRA ANESTHESIA: Mod: CRNA,,, | Performed by: NURSE ANESTHETIST, CERTIFIED REGISTERED

## 2021-01-14 PROCEDURE — 94761 N-INVAS EAR/PLS OXIMETRY MLT: CPT

## 2021-01-14 PROCEDURE — 63600175 PHARM REV CODE 636 W HCPCS: Performed by: NURSE ANESTHETIST, CERTIFIED REGISTERED

## 2021-01-14 PROCEDURE — 25000003 PHARM REV CODE 250: Performed by: SURGERY

## 2021-01-14 PROCEDURE — D9220A PRA ANESTHESIA: ICD-10-PCS | Mod: CRNA,,, | Performed by: NURSE ANESTHETIST, CERTIFIED REGISTERED

## 2021-01-14 PROCEDURE — 71000015 HC POSTOP RECOV 1ST HR: Performed by: SURGERY

## 2021-01-14 PROCEDURE — 76098 X-RAY EXAM SURGICAL SPECIMEN: CPT | Mod: TC

## 2021-01-14 PROCEDURE — 63600175 PHARM REV CODE 636 W HCPCS: Performed by: STUDENT IN AN ORGANIZED HEALTH CARE EDUCATION/TRAINING PROGRAM

## 2021-01-14 RX ORDER — ONDANSETRON 2 MG/ML
INJECTION INTRAMUSCULAR; INTRAVENOUS
Status: DISCONTINUED | OUTPATIENT
Start: 2021-01-14 | End: 2021-01-14

## 2021-01-14 RX ORDER — OXYCODONE HYDROCHLORIDE 5 MG/1
5 TABLET ORAL
Status: DISCONTINUED | OUTPATIENT
Start: 2021-01-14 | End: 2021-01-14 | Stop reason: HOSPADM

## 2021-01-14 RX ORDER — BUPIVACAINE HYDROCHLORIDE 2.5 MG/ML
INJECTION, SOLUTION EPIDURAL; INFILTRATION; INTRACAUDAL
Status: DISCONTINUED | OUTPATIENT
Start: 2021-01-14 | End: 2021-01-14 | Stop reason: HOSPADM

## 2021-01-14 RX ORDER — CARBOXYMETHYLCELLULOSE SODIUM 5 MG/ML
SOLUTION/ DROPS OPHTHALMIC
Status: DISCONTINUED | OUTPATIENT
Start: 2021-01-14 | End: 2021-01-14

## 2021-01-14 RX ORDER — ACETAMINOPHEN 500 MG
1000 TABLET ORAL ONCE
Status: COMPLETED | OUTPATIENT
Start: 2021-01-14 | End: 2021-01-14

## 2021-01-14 RX ORDER — PROPOFOL 10 MG/ML
VIAL (ML) INTRAVENOUS
Status: DISCONTINUED | OUTPATIENT
Start: 2021-01-14 | End: 2021-01-14

## 2021-01-14 RX ORDER — SODIUM CHLORIDE 9 MG/ML
INJECTION, SOLUTION INTRAVENOUS CONTINUOUS
Status: DISCONTINUED | OUTPATIENT
Start: 2021-01-14 | End: 2021-01-14 | Stop reason: HOSPADM

## 2021-01-14 RX ORDER — LIDOCAINE HYDROCHLORIDE 10 MG/ML
INJECTION INFILTRATION; PERINEURAL
Status: DISCONTINUED | OUTPATIENT
Start: 2021-01-14 | End: 2021-01-14 | Stop reason: HOSPADM

## 2021-01-14 RX ORDER — LIDOCAINE HYDROCHLORIDE 20 MG/ML
INJECTION INTRAVENOUS
Status: DISCONTINUED | OUTPATIENT
Start: 2021-01-14 | End: 2021-01-14

## 2021-01-14 RX ORDER — DEXAMETHASONE SODIUM PHOSPHATE 4 MG/ML
INJECTION, SOLUTION INTRA-ARTICULAR; INTRALESIONAL; INTRAMUSCULAR; INTRAVENOUS; SOFT TISSUE
Status: DISCONTINUED | OUTPATIENT
Start: 2021-01-14 | End: 2021-01-14

## 2021-01-14 RX ORDER — CELECOXIB 200 MG/1
400 CAPSULE ORAL ONCE
Status: COMPLETED | OUTPATIENT
Start: 2021-01-14 | End: 2021-01-14

## 2021-01-14 RX ORDER — FENTANYL CITRATE 50 UG/ML
25 INJECTION, SOLUTION INTRAMUSCULAR; INTRAVENOUS EVERY 5 MIN PRN
Status: DISCONTINUED | OUTPATIENT
Start: 2021-01-14 | End: 2021-01-14 | Stop reason: HOSPADM

## 2021-01-14 RX ORDER — MIDAZOLAM HYDROCHLORIDE 1 MG/ML
INJECTION, SOLUTION INTRAMUSCULAR; INTRAVENOUS
Status: DISCONTINUED | OUTPATIENT
Start: 2021-01-14 | End: 2021-01-14

## 2021-01-14 RX ORDER — HALOPERIDOL 5 MG/ML
0.5 INJECTION INTRAMUSCULAR EVERY 10 MIN PRN
Status: DISCONTINUED | OUTPATIENT
Start: 2021-01-14 | End: 2021-01-14 | Stop reason: HOSPADM

## 2021-01-14 RX ORDER — CEFAZOLIN SODIUM 1 G/3ML
2 INJECTION, POWDER, FOR SOLUTION INTRAMUSCULAR; INTRAVENOUS
Status: COMPLETED | OUTPATIENT
Start: 2021-01-14 | End: 2021-01-14

## 2021-01-14 RX ORDER — FENTANYL CITRATE 50 UG/ML
INJECTION, SOLUTION INTRAMUSCULAR; INTRAVENOUS
Status: DISCONTINUED | OUTPATIENT
Start: 2021-01-14 | End: 2021-01-14

## 2021-01-14 RX ADMIN — FENTANYL CITRATE 100 MCG: 50 INJECTION, SOLUTION INTRAMUSCULAR; INTRAVENOUS at 07:01

## 2021-01-14 RX ADMIN — SODIUM CHLORIDE 70 ML/HR: 0.9 INJECTION, SOLUTION INTRAVENOUS at 05:01

## 2021-01-14 RX ADMIN — DEXAMETHASONE SODIUM PHOSPHATE 4 MG: 4 INJECTION, SOLUTION INTRAMUSCULAR; INTRAVENOUS at 07:01

## 2021-01-14 RX ADMIN — CELECOXIB 400 MG: 200 CAPSULE ORAL at 05:01

## 2021-01-14 RX ADMIN — PROPOFOL 150 MG: 10 INJECTION, EMULSION INTRAVENOUS at 07:01

## 2021-01-14 RX ADMIN — ACETAMINOPHEN 1000 MG: 500 TABLET ORAL at 05:01

## 2021-01-14 RX ADMIN — SODIUM CHLORIDE: 0.9 INJECTION, SOLUTION INTRAVENOUS at 06:01

## 2021-01-14 RX ADMIN — ONDANSETRON 4 MG: 2 INJECTION, SOLUTION INTRAMUSCULAR; INTRAVENOUS at 07:01

## 2021-01-14 RX ADMIN — CEFAZOLIN 2 G: 330 INJECTION, POWDER, FOR SOLUTION INTRAMUSCULAR; INTRAVENOUS at 07:01

## 2021-01-14 RX ADMIN — CARBOXYMETHYLCELLULOSE SODIUM 2 DROP: 5 SOLUTION/ DROPS OPHTHALMIC at 07:01

## 2021-01-14 RX ADMIN — MIDAZOLAM HYDROCHLORIDE 2 MG: 1 INJECTION, SOLUTION INTRAMUSCULAR; INTRAVENOUS at 06:01

## 2021-01-14 RX ADMIN — LIDOCAINE HYDROCHLORIDE 100 MG: 20 INJECTION, SOLUTION INTRAVENOUS at 07:01

## 2021-01-18 ENCOUNTER — PATIENT MESSAGE (OUTPATIENT)
Dept: INTERNAL MEDICINE | Facility: CLINIC | Age: 61
End: 2021-01-18

## 2021-01-20 LAB
FINAL PATHOLOGIC DIAGNOSIS: NORMAL
GROSS: NORMAL
Lab: NORMAL

## 2021-01-27 ENCOUNTER — OFFICE VISIT (OUTPATIENT)
Dept: SURGERY | Facility: CLINIC | Age: 61
End: 2021-01-27
Payer: COMMERCIAL

## 2021-01-27 VITALS
HEIGHT: 65 IN | DIASTOLIC BLOOD PRESSURE: 87 MMHG | WEIGHT: 259.94 LBS | BODY MASS INDEX: 43.31 KG/M2 | HEART RATE: 68 BPM | SYSTOLIC BLOOD PRESSURE: 177 MMHG

## 2021-01-27 DIAGNOSIS — D17.9 ANGIOLIPOMA: Primary | ICD-10-CM

## 2021-01-27 PROCEDURE — 1126F PR PAIN SEVERITY QUANTIFIED, NO PAIN PRESENT: ICD-10-PCS | Mod: S$GLB,,, | Performed by: SURGERY

## 2021-01-27 PROCEDURE — 99024 POSTOP FOLLOW-UP VISIT: CPT | Mod: S$GLB,,, | Performed by: SURGERY

## 2021-01-27 PROCEDURE — 99999 PR PBB SHADOW E&M-EST. PATIENT-LVL III: ICD-10-PCS | Mod: PBBFAC,,, | Performed by: SURGERY

## 2021-01-27 PROCEDURE — 3008F PR BODY MASS INDEX (BMI) DOCUMENTED: ICD-10-PCS | Mod: CPTII,S$GLB,, | Performed by: SURGERY

## 2021-01-27 PROCEDURE — 3008F BODY MASS INDEX DOCD: CPT | Mod: CPTII,S$GLB,, | Performed by: SURGERY

## 2021-01-27 PROCEDURE — 99024 PR POST-OP FOLLOW-UP VISIT: ICD-10-PCS | Mod: S$GLB,,, | Performed by: SURGERY

## 2021-01-27 PROCEDURE — 1126F AMNT PAIN NOTED NONE PRSNT: CPT | Mod: S$GLB,,, | Performed by: SURGERY

## 2021-01-27 PROCEDURE — 99999 PR PBB SHADOW E&M-EST. PATIENT-LVL III: CPT | Mod: PBBFAC,,, | Performed by: SURGERY

## 2021-03-19 ENCOUNTER — PATIENT MESSAGE (OUTPATIENT)
Dept: RESEARCH | Facility: HOSPITAL | Age: 61
End: 2021-03-19

## 2021-04-16 ENCOUNTER — PATIENT MESSAGE (OUTPATIENT)
Dept: RESEARCH | Facility: HOSPITAL | Age: 61
End: 2021-04-16

## 2021-05-19 ENCOUNTER — TELEPHONE (OUTPATIENT)
Dept: INTERNAL MEDICINE | Facility: CLINIC | Age: 61
End: 2021-05-19

## 2021-05-19 DIAGNOSIS — R92.8 ABNORMAL MAMMOGRAM: Primary | ICD-10-CM

## 2021-06-25 ENCOUNTER — HOSPITAL ENCOUNTER (OUTPATIENT)
Dept: RADIOLOGY | Facility: HOSPITAL | Age: 61
Discharge: HOME | End: 2021-06-25
Attending: INTERNAL MEDICINE
Payer: COMMERCIAL

## 2021-06-25 VITALS — BODY MASS INDEX: 40.18 KG/M2 | WEIGHT: 250 LBS | HEIGHT: 66 IN

## 2021-06-25 DIAGNOSIS — R92.8 ABNORMAL MAMMOGRAM: ICD-10-CM

## 2021-06-25 PROCEDURE — 77066 MAMMO DIGITAL DIAGNOSTIC BILAT WITH TOMO: ICD-10-PCS | Mod: 26,,, | Performed by: RADIOLOGY

## 2021-06-25 PROCEDURE — 77066 DX MAMMO INCL CAD BI: CPT | Mod: 26,,, | Performed by: RADIOLOGY

## 2021-06-25 PROCEDURE — 77062 MAMMO DIGITAL DIAGNOSTIC BILAT WITH TOMO: ICD-10-PCS | Mod: 26,,, | Performed by: RADIOLOGY

## 2021-06-25 PROCEDURE — 77062 BREAST TOMOSYNTHESIS BI: CPT | Mod: 26,,, | Performed by: RADIOLOGY

## 2021-06-25 PROCEDURE — 77062 BREAST TOMOSYNTHESIS BI: CPT | Mod: TC

## 2021-10-04 ENCOUNTER — PATIENT MESSAGE (OUTPATIENT)
Dept: ADMINISTRATIVE | Facility: HOSPITAL | Age: 61
End: 2021-10-04

## 2021-10-21 ENCOUNTER — PATIENT OUTREACH (OUTPATIENT)
Dept: ADMINISTRATIVE | Facility: HOSPITAL | Age: 61
End: 2021-10-21

## 2021-10-21 ENCOUNTER — PATIENT MESSAGE (OUTPATIENT)
Dept: ADMINISTRATIVE | Facility: HOSPITAL | Age: 61
End: 2021-10-21
Payer: COMMERCIAL

## 2021-10-26 ENCOUNTER — TELEPHONE (OUTPATIENT)
Dept: INTERNAL MEDICINE | Facility: CLINIC | Age: 61
End: 2021-10-26
Payer: COMMERCIAL

## 2021-10-26 DIAGNOSIS — Z00.00 ENCOUNTER FOR PREVENTIVE HEALTH EXAMINATION: Primary | ICD-10-CM

## 2021-10-27 ENCOUNTER — LAB VISIT (OUTPATIENT)
Dept: LAB | Facility: HOSPITAL | Age: 61
End: 2021-10-27
Payer: COMMERCIAL

## 2021-10-27 DIAGNOSIS — Z00.00 ENCOUNTER FOR PREVENTIVE HEALTH EXAMINATION: ICD-10-CM

## 2021-10-27 LAB
25(OH)D3+25(OH)D2 SERPL-MCNC: 41 NG/ML (ref 30–96)
ALBUMIN SERPL BCP-MCNC: 3.8 G/DL (ref 3.5–5.2)
ALP SERPL-CCNC: 104 U/L (ref 55–135)
ALT SERPL W/O P-5'-P-CCNC: 28 U/L (ref 10–44)
ANION GAP SERPL CALC-SCNC: 11 MMOL/L (ref 8–16)
AST SERPL-CCNC: 24 U/L (ref 10–40)
BASOPHILS # BLD AUTO: 0.04 K/UL (ref 0–0.2)
BASOPHILS NFR BLD: 0.7 % (ref 0–1.9)
BILIRUB SERPL-MCNC: 0.9 MG/DL (ref 0.1–1)
BUN SERPL-MCNC: 15 MG/DL (ref 8–23)
CALCIUM SERPL-MCNC: 9.9 MG/DL (ref 8.7–10.5)
CHLORIDE SERPL-SCNC: 104 MMOL/L (ref 95–110)
CHOLEST SERPL-MCNC: 178 MG/DL (ref 120–199)
CHOLEST/HDLC SERPL: 3.8 {RATIO} (ref 2–5)
CO2 SERPL-SCNC: 22 MMOL/L (ref 23–29)
CREAT SERPL-MCNC: 0.8 MG/DL (ref 0.5–1.4)
DIFFERENTIAL METHOD: ABNORMAL
EOSINOPHIL # BLD AUTO: 0.1 K/UL (ref 0–0.5)
EOSINOPHIL NFR BLD: 1.6 % (ref 0–8)
ERYTHROCYTE [DISTWIDTH] IN BLOOD BY AUTOMATED COUNT: 13.3 % (ref 11.5–14.5)
EST. GFR  (AFRICAN AMERICAN): >60 ML/MIN/1.73 M^2
EST. GFR  (NON AFRICAN AMERICAN): >60 ML/MIN/1.73 M^2
GLUCOSE SERPL-MCNC: 102 MG/DL (ref 70–110)
HCT VFR BLD AUTO: 41.1 % (ref 37–48.5)
HDLC SERPL-MCNC: 47 MG/DL (ref 40–75)
HDLC SERPL: 26.4 % (ref 20–50)
HGB BLD-MCNC: 14.4 G/DL (ref 12–16)
IMM GRANULOCYTES # BLD AUTO: 0.01 K/UL (ref 0–0.04)
IMM GRANULOCYTES NFR BLD AUTO: 0.2 % (ref 0–0.5)
LDLC SERPL CALC-MCNC: 114.6 MG/DL (ref 63–159)
LYMPHOCYTES # BLD AUTO: 2.6 K/UL (ref 1–4.8)
LYMPHOCYTES NFR BLD: 41.9 % (ref 18–48)
MCH RBC QN AUTO: 32.5 PG (ref 27–31)
MCHC RBC AUTO-ENTMCNC: 35 G/DL (ref 32–36)
MCV RBC AUTO: 93 FL (ref 82–98)
MONOCYTES # BLD AUTO: 0.4 K/UL (ref 0.3–1)
MONOCYTES NFR BLD: 6.7 % (ref 4–15)
NEUTROPHILS # BLD AUTO: 3 K/UL (ref 1.8–7.7)
NEUTROPHILS NFR BLD: 48.9 % (ref 38–73)
NONHDLC SERPL-MCNC: 131 MG/DL
NRBC BLD-RTO: 0 /100 WBC
PLATELET # BLD AUTO: 189 K/UL (ref 150–450)
PMV BLD AUTO: 10.8 FL (ref 9.2–12.9)
POTASSIUM SERPL-SCNC: 3.7 MMOL/L (ref 3.5–5.1)
PROT SERPL-MCNC: 7.2 G/DL (ref 6–8.4)
RBC # BLD AUTO: 4.43 M/UL (ref 4–5.4)
SODIUM SERPL-SCNC: 137 MMOL/L (ref 136–145)
TRIGL SERPL-MCNC: 82 MG/DL (ref 30–150)
TSH SERPL DL<=0.005 MIU/L-ACNC: 1.37 UIU/ML (ref 0.4–4)
WBC # BLD AUTO: 6.08 K/UL (ref 3.9–12.7)

## 2021-10-27 PROCEDURE — 84443 ASSAY THYROID STIM HORMONE: CPT | Performed by: PHYSICIAN ASSISTANT

## 2021-10-27 PROCEDURE — 82306 VITAMIN D 25 HYDROXY: CPT | Performed by: PHYSICIAN ASSISTANT

## 2021-10-27 PROCEDURE — 80061 LIPID PANEL: CPT | Performed by: PHYSICIAN ASSISTANT

## 2021-10-27 PROCEDURE — 36415 COLL VENOUS BLD VENIPUNCTURE: CPT | Performed by: PHYSICIAN ASSISTANT

## 2021-10-27 PROCEDURE — 85025 COMPLETE CBC W/AUTO DIFF WBC: CPT | Performed by: PHYSICIAN ASSISTANT

## 2021-10-27 PROCEDURE — 80053 COMPREHEN METABOLIC PANEL: CPT | Performed by: PHYSICIAN ASSISTANT

## 2021-10-28 ENCOUNTER — OFFICE VISIT (OUTPATIENT)
Dept: INTERNAL MEDICINE | Facility: CLINIC | Age: 61
End: 2021-10-28
Payer: COMMERCIAL

## 2021-10-28 ENCOUNTER — IMMUNIZATION (OUTPATIENT)
Dept: INTERNAL MEDICINE | Facility: CLINIC | Age: 61
End: 2021-10-28
Payer: COMMERCIAL

## 2021-10-28 VITALS
HEART RATE: 75 BPM | SYSTOLIC BLOOD PRESSURE: 110 MMHG | HEIGHT: 66 IN | OXYGEN SATURATION: 97 % | DIASTOLIC BLOOD PRESSURE: 70 MMHG | WEIGHT: 261.13 LBS | BODY MASS INDEX: 41.97 KG/M2

## 2021-10-28 DIAGNOSIS — Z23 NEED FOR VACCINATION: Primary | ICD-10-CM

## 2021-10-28 DIAGNOSIS — Z00.00 ROUTINE GENERAL MEDICAL EXAMINATION AT A HEALTH CARE FACILITY: Primary | ICD-10-CM

## 2021-10-28 PROCEDURE — 91300 COVID-19, MRNA, LNP-S, PF, 30 MCG/0.3 ML DOSE VACCINE: CPT | Mod: PBBFAC | Performed by: INTERNAL MEDICINE

## 2021-10-28 PROCEDURE — 99396 PREV VISIT EST AGE 40-64: CPT | Mod: S$GLB,,, | Performed by: INTERNAL MEDICINE

## 2021-10-28 PROCEDURE — 99999 PR PBB SHADOW E&M-EST. PATIENT-LVL II: ICD-10-PCS | Mod: PBBFAC,,, | Performed by: INTERNAL MEDICINE

## 2021-10-28 PROCEDURE — 99396 PR PREVENTIVE VISIT,EST,40-64: ICD-10-PCS | Mod: S$GLB,,, | Performed by: INTERNAL MEDICINE

## 2021-10-28 PROCEDURE — 3074F SYST BP LT 130 MM HG: CPT | Mod: CPTII,S$GLB,, | Performed by: INTERNAL MEDICINE

## 2021-10-28 PROCEDURE — 3008F PR BODY MASS INDEX (BMI) DOCUMENTED: ICD-10-PCS | Mod: CPTII,S$GLB,, | Performed by: INTERNAL MEDICINE

## 2021-10-28 PROCEDURE — 0003A COVID-19, MRNA, LNP-S, PF, 30 MCG/0.3 ML DOSE VACCINE: CPT | Mod: CV19,PBBFAC | Performed by: INTERNAL MEDICINE

## 2021-10-28 PROCEDURE — 99999 PR PBB SHADOW E&M-EST. PATIENT-LVL II: CPT | Mod: PBBFAC,,, | Performed by: INTERNAL MEDICINE

## 2021-10-28 PROCEDURE — 3078F DIAST BP <80 MM HG: CPT | Mod: CPTII,S$GLB,, | Performed by: INTERNAL MEDICINE

## 2021-10-28 PROCEDURE — 3008F BODY MASS INDEX DOCD: CPT | Mod: CPTII,S$GLB,, | Performed by: INTERNAL MEDICINE

## 2021-10-28 PROCEDURE — 3078F PR MOST RECENT DIASTOLIC BLOOD PRESSURE < 80 MM HG: ICD-10-PCS | Mod: CPTII,S$GLB,, | Performed by: INTERNAL MEDICINE

## 2021-10-28 PROCEDURE — 3074F PR MOST RECENT SYSTOLIC BLOOD PRESSURE < 130 MM HG: ICD-10-PCS | Mod: CPTII,S$GLB,, | Performed by: INTERNAL MEDICINE

## 2021-10-28 RX ORDER — DOXYCYCLINE 100 MG/1
100 CAPSULE ORAL 2 TIMES DAILY
Qty: 20 CAPSULE | Refills: 2 | Status: SHIPPED | OUTPATIENT
Start: 2021-10-28 | End: 2022-09-02 | Stop reason: SDUPTHER

## 2021-10-28 RX ORDER — VALSARTAN AND HYDROCHLOROTHIAZIDE 320; 25 MG/1; MG/1
1 TABLET, FILM COATED ORAL DAILY
Qty: 90 TABLET | Refills: 3 | Status: SHIPPED | OUTPATIENT
Start: 2021-10-28 | End: 2022-09-02 | Stop reason: SDUPTHER

## 2021-11-02 ENCOUNTER — PATIENT OUTREACH (OUTPATIENT)
Dept: ADMINISTRATIVE | Facility: HOSPITAL | Age: 61
End: 2021-11-02
Payer: COMMERCIAL

## 2022-04-13 ENCOUNTER — TELEPHONE (OUTPATIENT)
Dept: INTERNAL MEDICINE | Facility: CLINIC | Age: 62
End: 2022-04-13
Payer: COMMERCIAL

## 2022-04-13 DIAGNOSIS — R92.8 ABNORMAL MAMMOGRAM: Primary | ICD-10-CM

## 2022-04-13 NOTE — TELEPHONE ENCOUNTER
----- Message from Rigo Delong sent at 4/13/2022  4:21 PM CDT -----  Patient called requesting an order be submitted for scheduling her annual Mammo screening. Callback [phone]

## 2022-07-05 ENCOUNTER — HOSPITAL ENCOUNTER (OUTPATIENT)
Dept: RADIOLOGY | Facility: HOSPITAL | Age: 62
Discharge: HOME | End: 2022-07-05
Attending: INTERNAL MEDICINE
Payer: COMMERCIAL

## 2022-07-05 VITALS — HEIGHT: 66 IN | BODY MASS INDEX: 41.95 KG/M2 | WEIGHT: 261 LBS

## 2022-07-05 DIAGNOSIS — R92.8 ABNORMAL MAMMOGRAM: ICD-10-CM

## 2022-07-05 PROCEDURE — 77063 BREAST TOMOSYNTHESIS BI: CPT | Mod: 26,,, | Performed by: RADIOLOGY

## 2022-07-05 PROCEDURE — 77063 MAMMO DIGITAL SCREENING BILAT WITH TOMO: ICD-10-PCS | Mod: 26,,, | Performed by: RADIOLOGY

## 2022-07-05 PROCEDURE — 77067 MAMMO DIGITAL SCREENING BILAT WITH TOMO: ICD-10-PCS | Mod: 26,,, | Performed by: RADIOLOGY

## 2022-07-05 PROCEDURE — 77063 BREAST TOMOSYNTHESIS BI: CPT | Mod: TC

## 2022-07-05 PROCEDURE — 77067 SCR MAMMO BI INCL CAD: CPT | Mod: 26,,, | Performed by: RADIOLOGY

## 2022-08-24 ENCOUNTER — TELEPHONE (OUTPATIENT)
Dept: INTERNAL MEDICINE | Facility: CLINIC | Age: 62
End: 2022-08-24
Payer: COMMERCIAL

## 2022-08-24 DIAGNOSIS — Z00.00 ROUTINE GENERAL MEDICAL EXAMINATION AT A HEALTH CARE FACILITY: Primary | ICD-10-CM

## 2022-09-02 ENCOUNTER — OFFICE VISIT (OUTPATIENT)
Dept: INTERNAL MEDICINE | Facility: CLINIC | Age: 62
End: 2022-09-02
Payer: COMMERCIAL

## 2022-09-02 ENCOUNTER — LAB VISIT (OUTPATIENT)
Dept: LAB | Facility: HOSPITAL | Age: 62
End: 2022-09-02
Attending: INTERNAL MEDICINE
Payer: COMMERCIAL

## 2022-09-02 VITALS
BODY MASS INDEX: 42.77 KG/M2 | OXYGEN SATURATION: 98 % | DIASTOLIC BLOOD PRESSURE: 89 MMHG | SYSTOLIC BLOOD PRESSURE: 139 MMHG | HEART RATE: 73 BPM | WEIGHT: 266.13 LBS | HEIGHT: 66 IN

## 2022-09-02 DIAGNOSIS — Z00.00 ROUTINE GENERAL MEDICAL EXAMINATION AT A HEALTH CARE FACILITY: ICD-10-CM

## 2022-09-02 DIAGNOSIS — E66.01 MORBID OBESITY: ICD-10-CM

## 2022-09-02 DIAGNOSIS — I10 PRIMARY HYPERTENSION: ICD-10-CM

## 2022-09-02 DIAGNOSIS — I87.2 VENOUS INSUFFICIENCY: Primary | ICD-10-CM

## 2022-09-02 LAB
25(OH)D3+25(OH)D2 SERPL-MCNC: 34 NG/ML (ref 30–96)
ALBUMIN SERPL BCP-MCNC: 3.8 G/DL (ref 3.5–5.2)
ALP SERPL-CCNC: 95 U/L (ref 55–135)
ALT SERPL W/O P-5'-P-CCNC: 53 U/L (ref 10–44)
ANION GAP SERPL CALC-SCNC: 9 MMOL/L (ref 8–16)
AST SERPL-CCNC: 38 U/L (ref 10–40)
BASOPHILS # BLD AUTO: 0.03 K/UL (ref 0–0.2)
BASOPHILS NFR BLD: 0.6 % (ref 0–1.9)
BILIRUB SERPL-MCNC: 0.7 MG/DL (ref 0.1–1)
BUN SERPL-MCNC: 13 MG/DL (ref 8–23)
CALCIUM SERPL-MCNC: 9 MG/DL (ref 8.7–10.5)
CHLORIDE SERPL-SCNC: 110 MMOL/L (ref 95–110)
CHOLEST SERPL-MCNC: 176 MG/DL (ref 120–199)
CHOLEST/HDLC SERPL: 4.1 {RATIO} (ref 2–5)
CO2 SERPL-SCNC: 26 MMOL/L (ref 23–29)
CREAT SERPL-MCNC: 0.8 MG/DL (ref 0.5–1.4)
DIFFERENTIAL METHOD: ABNORMAL
EOSINOPHIL # BLD AUTO: 0.1 K/UL (ref 0–0.5)
EOSINOPHIL NFR BLD: 1.7 % (ref 0–8)
ERYTHROCYTE [DISTWIDTH] IN BLOOD BY AUTOMATED COUNT: 13.7 % (ref 11.5–14.5)
EST. GFR  (NO RACE VARIABLE): >60 ML/MIN/1.73 M^2
ESTIMATED AVG GLUCOSE: 120 MG/DL (ref 68–131)
GLUCOSE SERPL-MCNC: 118 MG/DL (ref 70–110)
HBA1C MFR BLD: 5.8 % (ref 4–5.6)
HCT VFR BLD AUTO: 37.5 % (ref 37–48.5)
HDLC SERPL-MCNC: 43 MG/DL (ref 40–75)
HDLC SERPL: 24.4 % (ref 20–50)
HGB BLD-MCNC: 13.5 G/DL (ref 12–16)
IMM GRANULOCYTES # BLD AUTO: 0.01 K/UL (ref 0–0.04)
IMM GRANULOCYTES NFR BLD AUTO: 0.2 % (ref 0–0.5)
LDLC SERPL CALC-MCNC: 116 MG/DL (ref 63–159)
LYMPHOCYTES # BLD AUTO: 2.8 K/UL (ref 1–4.8)
LYMPHOCYTES NFR BLD: 52.7 % (ref 18–48)
MCH RBC QN AUTO: 32.3 PG (ref 27–31)
MCHC RBC AUTO-ENTMCNC: 36 G/DL (ref 32–36)
MCV RBC AUTO: 90 FL (ref 82–98)
MONOCYTES # BLD AUTO: 0.5 K/UL (ref 0.3–1)
MONOCYTES NFR BLD: 8.9 % (ref 4–15)
NEUTROPHILS # BLD AUTO: 1.9 K/UL (ref 1.8–7.7)
NEUTROPHILS NFR BLD: 35.9 % (ref 38–73)
NONHDLC SERPL-MCNC: 133 MG/DL
NRBC BLD-RTO: 0 /100 WBC
PLATELET # BLD AUTO: 169 K/UL (ref 150–450)
PMV BLD AUTO: 10.9 FL (ref 9.2–12.9)
POTASSIUM SERPL-SCNC: 4 MMOL/L (ref 3.5–5.1)
PROT SERPL-MCNC: 6.6 G/DL (ref 6–8.4)
RBC # BLD AUTO: 4.18 M/UL (ref 4–5.4)
SODIUM SERPL-SCNC: 145 MMOL/L (ref 136–145)
TRIGL SERPL-MCNC: 85 MG/DL (ref 30–150)
TSH SERPL DL<=0.005 MIU/L-ACNC: 1.25 UIU/ML (ref 0.4–4)
VIT B12 SERPL-MCNC: 812 PG/ML (ref 210–950)
WBC # BLD AUTO: 5.28 K/UL (ref 3.9–12.7)

## 2022-09-02 PROCEDURE — 82306 VITAMIN D 25 HYDROXY: CPT | Performed by: INTERNAL MEDICINE

## 2022-09-02 PROCEDURE — 36415 COLL VENOUS BLD VENIPUNCTURE: CPT | Performed by: INTERNAL MEDICINE

## 2022-09-02 PROCEDURE — 85025 COMPLETE CBC W/AUTO DIFF WBC: CPT | Performed by: INTERNAL MEDICINE

## 2022-09-02 PROCEDURE — 3075F PR MOST RECENT SYSTOLIC BLOOD PRESS GE 130-139MM HG: ICD-10-PCS | Mod: CPTII,S$GLB,, | Performed by: INTERNAL MEDICINE

## 2022-09-02 PROCEDURE — 82607 VITAMIN B-12: CPT | Performed by: INTERNAL MEDICINE

## 2022-09-02 PROCEDURE — 3079F PR MOST RECENT DIASTOLIC BLOOD PRESSURE 80-89 MM HG: ICD-10-PCS | Mod: CPTII,S$GLB,, | Performed by: INTERNAL MEDICINE

## 2022-09-02 PROCEDURE — 3075F SYST BP GE 130 - 139MM HG: CPT | Mod: CPTII,S$GLB,, | Performed by: INTERNAL MEDICINE

## 2022-09-02 PROCEDURE — 3008F PR BODY MASS INDEX (BMI) DOCUMENTED: ICD-10-PCS | Mod: CPTII,S$GLB,, | Performed by: INTERNAL MEDICINE

## 2022-09-02 PROCEDURE — 99396 PR PREVENTIVE VISIT,EST,40-64: ICD-10-PCS | Mod: S$GLB,,, | Performed by: INTERNAL MEDICINE

## 2022-09-02 PROCEDURE — 99396 PREV VISIT EST AGE 40-64: CPT | Mod: S$GLB,,, | Performed by: INTERNAL MEDICINE

## 2022-09-02 PROCEDURE — 84443 ASSAY THYROID STIM HORMONE: CPT | Performed by: INTERNAL MEDICINE

## 2022-09-02 PROCEDURE — 83036 HEMOGLOBIN GLYCOSYLATED A1C: CPT | Performed by: INTERNAL MEDICINE

## 2022-09-02 PROCEDURE — 3079F DIAST BP 80-89 MM HG: CPT | Mod: CPTII,S$GLB,, | Performed by: INTERNAL MEDICINE

## 2022-09-02 PROCEDURE — 1159F MED LIST DOCD IN RCRD: CPT | Mod: CPTII,S$GLB,, | Performed by: INTERNAL MEDICINE

## 2022-09-02 PROCEDURE — 80053 COMPREHEN METABOLIC PANEL: CPT | Performed by: INTERNAL MEDICINE

## 2022-09-02 PROCEDURE — 99999 PR PBB SHADOW E&M-EST. PATIENT-LVL III: ICD-10-PCS | Mod: PBBFAC,,, | Performed by: INTERNAL MEDICINE

## 2022-09-02 PROCEDURE — 80061 LIPID PANEL: CPT | Performed by: INTERNAL MEDICINE

## 2022-09-02 PROCEDURE — 3008F BODY MASS INDEX DOCD: CPT | Mod: CPTII,S$GLB,, | Performed by: INTERNAL MEDICINE

## 2022-09-02 PROCEDURE — 1159F PR MEDICATION LIST DOCUMENTED IN MEDICAL RECORD: ICD-10-PCS | Mod: CPTII,S$GLB,, | Performed by: INTERNAL MEDICINE

## 2022-09-02 PROCEDURE — 99999 PR PBB SHADOW E&M-EST. PATIENT-LVL III: CPT | Mod: PBBFAC,,, | Performed by: INTERNAL MEDICINE

## 2022-09-02 RX ORDER — VALSARTAN AND HYDROCHLOROTHIAZIDE 320; 25 MG/1; MG/1
1 TABLET, FILM COATED ORAL DAILY
Qty: 90 TABLET | Refills: 3 | Status: SHIPPED | OUTPATIENT
Start: 2022-09-02 | End: 2023-09-05 | Stop reason: SDUPTHER

## 2022-09-02 RX ORDER — DOXYCYCLINE 100 MG/1
100 CAPSULE ORAL 2 TIMES DAILY
Qty: 20 CAPSULE | Refills: 2 | Status: SHIPPED | OUTPATIENT
Start: 2022-09-02 | End: 2023-09-05 | Stop reason: SDUPTHER

## 2022-09-02 NOTE — PROGRESS NOTES
CHIEF COMPLAINT: Annual exam    HISTORY OF PRESENT ILLNESS: This is a 62-year-old woman who presents for her annual exam     She wears her compression stockings 3 times a week. She continues to have chronic swelling in legs, left greater than right.  No fever, chills, nausea, vomiting, constipation, diarrhea. She has occasional irritation and redness in the lower legs and she starts on doxycycline right away takes care of the problem. No cellulitis recently.      She had a MI/BSO 11 which was complicated by a partial small bowel obstruction and left subclavian DVT. She completed her 3 months of Xarelto. Ultrasound of left upper extremity was fine 2013. NO left arm pain or swelling.       She continues to take valsartan hct 320/25 once daily for hypertension. NO chest pain or shortness of breath. She is taking vitamin D supplement 5000 units daily for her vitamin D deficiency.      She had an angiolipoma 2021 from the left breast            PAST MEDICAL HISTORY:   1. Hypertension  2. History of cellulitis of the right lower extremity 2008.   3. Venous insufficiency   4. Mild vitamin D deficiency.   5. Obesity.    6. Left subclavian DVT 2013 - postoperatively    7. Partial small bowel obstruction after MI/BSO 13    PAST SURGICAL HISTORY:   1. Appendectomy in 2002 with complication of what sounds like small bowel obstruction.   2. Umbilical hernia repair in .   3. D&C 2008 secondary to irregular menstrual periods.    4. MI/BSO and small bowel resection with anastamosis 2013    SOCIAL HISTORY: She quit smoking in , smoked less than a pack of cigarettes daily for 8 years, drinks alcohol once 1-2 times a month. Single, no children. She is an . Now  of [address].     FAMILY HISTORY: Mother is living with rheumatoid arthritis. Father  of heart problems. She has a sister and three brothers who are healthy. One brother was recently diagnosed with FSGS.  "Brother  of complications of drugs     REVIEW OF SYSTEMS: She denies fevers, chills, night sweats, fatigue, visual change, hearing loss, sinus congestion, sore throat, chest pain, shortness of breath, nausea, vomiting, constipation, diarrhea, dysuria, hematuria, polydipsia, polyuria, joint pain, muscle pain, headaches, anxiety, depression, insomnia.       PHYSICAL EXAMINATION:         BP (!) 146/88   Pulse 73   Ht 5' 6" (1.676 m)   Wt 120.7 kg (266 lb 1.5 oz)   LMP 2013   SpO2 98%   BMI 42.95 kg/m²     General: Alert, oriented. No apparent distress. Affect within normal limits.   Conjunctivae anicteric. Tympanic membranes clear. Oropharynx clear.   Neck supple. No cervical lymphadenopathy, no thyroid enlargement.   Respiratory effort normal. Lungs clear to auscultation.   Heart: Regular rate and rhythm without murmurs, gallops or rubs.   ABDOMEN: soft, non distended, non tender, bowel sounds present, no hepatosplenomgaly    ASSESSMENT AND PLAN:       1. Annual exam - discussed diet, exercise and safety issues.  2. Venous insufficiency - compression stockings  3. Morbid obesity - work on diet and exercise  4. S/P MI/BSO - doing well - no menopausal symptoms    5. Partial small bowel obstruction - doing well    6.  Left subclavian thrombosis - resolved - completed 3 months of xarelto    7. Hypertension - monitor BP at home   8. Vitamin D deficency - vitamin D 5,000 units daily  9. Angiolipoma left breast - removed. MMG 22  Screening - colonoscopy 14 due 10 years. Mammogram 2022. Bone density was normal 2014     I'll see her back in 12 months , sooner if problems arise.    Adacel   "

## 2022-09-05 ENCOUNTER — PATIENT MESSAGE (OUTPATIENT)
Dept: INTERNAL MEDICINE | Facility: CLINIC | Age: 62
End: 2022-09-05
Payer: COMMERCIAL

## 2022-10-21 ENCOUNTER — IMMUNIZATION (OUTPATIENT)
Dept: INTERNAL MEDICINE | Facility: CLINIC | Age: 62
End: 2022-10-21
Payer: COMMERCIAL

## 2022-10-21 DIAGNOSIS — Z23 NEED FOR VACCINATION: Primary | ICD-10-CM

## 2022-10-21 PROCEDURE — 91312 COVID-19, MRNA, LNP-S, BIVALENT BOOSTER, PF, 30 MCG/0.3 ML DOSE: CPT | Mod: S$GLB,,, | Performed by: INTERNAL MEDICINE

## 2022-10-21 PROCEDURE — 0124A COVID-19, MRNA, LNP-S, BIVALENT BOOSTER, PF, 30 MCG/0.3 ML DOSE: CPT | Mod: CV19,PBBFAC | Performed by: INTERNAL MEDICINE

## 2022-10-21 PROCEDURE — 90471 FLU VACCINE (QUAD) GREATER THAN OR EQUAL TO 3YO PRESERVATIVE FREE IM: ICD-10-PCS | Mod: S$GLB,,, | Performed by: INTERNAL MEDICINE

## 2022-10-21 PROCEDURE — 90471 IMMUNIZATION ADMIN: CPT | Mod: S$GLB,,, | Performed by: INTERNAL MEDICINE

## 2022-10-21 PROCEDURE — 90686 IIV4 VACC NO PRSV 0.5 ML IM: CPT | Mod: S$GLB,,, | Performed by: INTERNAL MEDICINE

## 2022-10-21 PROCEDURE — 91312 COVID-19, MRNA, LNP-S, BIVALENT BOOSTER, PF, 30 MCG/0.3 ML DOSE: ICD-10-PCS | Mod: S$GLB,,, | Performed by: INTERNAL MEDICINE

## 2022-10-21 PROCEDURE — 90686 FLU VACCINE (QUAD) GREATER THAN OR EQUAL TO 3YO PRESERVATIVE FREE IM: ICD-10-PCS | Mod: S$GLB,,, | Performed by: INTERNAL MEDICINE

## 2023-05-14 ENCOUNTER — TELEPHONE (OUTPATIENT)
Dept: INTERNAL MEDICINE | Facility: CLINIC | Age: 63
End: 2023-05-14

## 2023-05-14 DIAGNOSIS — Z12.31 SCREENING MAMMOGRAM FOR BREAST CANCER: Primary | ICD-10-CM

## 2023-05-14 NOTE — TELEPHONE ENCOUNTER
----- Message from Cathleen Chow sent at 5/13/2023 10:29 AM CDT -----  Type:  Mammogram    Caller is requesting to schedule their annual mammogram appointment.  Order is not listed in EPIC.  Please enter order and contact patient to schedule.  Name of Caller:pt  [facility] they [facility]  Would the patient rather a call back or a response via [facility]? call  Best Call Back Number:[phone]  Additional Information:

## 2023-07-07 ENCOUNTER — HOSPITAL ENCOUNTER (OUTPATIENT)
Dept: RADIOLOGY | Facility: HOSPITAL | Age: 63
Discharge: HOME | End: 2023-07-07
Attending: INTERNAL MEDICINE
Payer: COMMERCIAL

## 2023-07-07 DIAGNOSIS — Z12.31 SCREENING MAMMOGRAM FOR BREAST CANCER: ICD-10-CM

## 2023-07-07 PROCEDURE — 77063 BREAST TOMOSYNTHESIS BI: CPT | Mod: 26,,, | Performed by: RADIOLOGY

## 2023-07-07 PROCEDURE — 77067 SCR MAMMO BI INCL CAD: CPT | Mod: 26,,, | Performed by: RADIOLOGY

## 2023-07-07 PROCEDURE — 77063 MAMMO DIGITAL SCREENING BILAT WITH TOMO: ICD-10-PCS | Mod: 26,,, | Performed by: RADIOLOGY

## 2023-07-07 PROCEDURE — 77067 SCR MAMMO BI INCL CAD: CPT | Mod: TC

## 2023-07-07 PROCEDURE — 77067 MAMMO DIGITAL SCREENING BILAT WITH TOMO: ICD-10-PCS | Mod: 26,,, | Performed by: RADIOLOGY

## 2023-08-29 ENCOUNTER — TELEPHONE (OUTPATIENT)
Dept: INTERNAL MEDICINE | Facility: CLINIC | Age: 63
End: 2023-08-29
Payer: COMMERCIAL

## 2023-08-29 DIAGNOSIS — R79.9 ABNORMAL BLOOD CHEMISTRY: ICD-10-CM

## 2023-08-29 DIAGNOSIS — Z00.00 ROUTINE GENERAL MEDICAL EXAMINATION AT A HEALTH CARE FACILITY: Primary | ICD-10-CM

## 2023-08-29 DIAGNOSIS — E53.8 VITAMIN B12 DEFICIENCY: ICD-10-CM

## 2023-08-29 DIAGNOSIS — E55.9 VITAMIN D DEFICIENCY DISEASE: ICD-10-CM

## 2023-08-29 DIAGNOSIS — E78.5 HYPERLIPIDEMIA, UNSPECIFIED HYPERLIPIDEMIA TYPE: ICD-10-CM

## 2023-08-29 NOTE — TELEPHONE ENCOUNTER
Pt is calling to see if she needs blood work prior appt. If so please schedule for Saturday @ 10am

## 2023-09-02 ENCOUNTER — LAB VISIT (OUTPATIENT)
Dept: LAB | Facility: HOSPITAL | Age: 63
End: 2023-09-02
Attending: INTERNAL MEDICINE
Payer: COMMERCIAL

## 2023-09-02 DIAGNOSIS — E53.8 VITAMIN B12 DEFICIENCY: ICD-10-CM

## 2023-09-02 DIAGNOSIS — E55.9 VITAMIN D DEFICIENCY DISEASE: ICD-10-CM

## 2023-09-02 DIAGNOSIS — R79.9 ABNORMAL BLOOD CHEMISTRY: ICD-10-CM

## 2023-09-02 DIAGNOSIS — E78.5 HYPERLIPIDEMIA, UNSPECIFIED HYPERLIPIDEMIA TYPE: ICD-10-CM

## 2023-09-02 DIAGNOSIS — Z00.00 ROUTINE GENERAL MEDICAL EXAMINATION AT A HEALTH CARE FACILITY: ICD-10-CM

## 2023-09-02 LAB
25(OH)D3+25(OH)D2 SERPL-MCNC: 29 NG/ML (ref 30–96)
ALBUMIN SERPL BCP-MCNC: 3.8 G/DL (ref 3.5–5.2)
ALP SERPL-CCNC: 93 U/L (ref 55–135)
ALT SERPL W/O P-5'-P-CCNC: 28 U/L (ref 10–44)
ANION GAP SERPL CALC-SCNC: 9 MMOL/L (ref 8–16)
AST SERPL-CCNC: 23 U/L (ref 10–40)
BASOPHILS # BLD AUTO: 0.03 K/UL (ref 0–0.2)
BASOPHILS NFR BLD: 0.6 % (ref 0–1.9)
BILIRUB SERPL-MCNC: 0.6 MG/DL (ref 0.1–1)
BUN SERPL-MCNC: 15 MG/DL (ref 8–23)
CALCIUM SERPL-MCNC: 9.2 MG/DL (ref 8.7–10.5)
CHLORIDE SERPL-SCNC: 106 MMOL/L (ref 95–110)
CHOLEST SERPL-MCNC: 165 MG/DL (ref 120–199)
CHOLEST/HDLC SERPL: 3.8 {RATIO} (ref 2–5)
CO2 SERPL-SCNC: 27 MMOL/L (ref 23–29)
CREAT SERPL-MCNC: 0.9 MG/DL (ref 0.5–1.4)
DIFFERENTIAL METHOD: ABNORMAL
EOSINOPHIL # BLD AUTO: 0.1 K/UL (ref 0–0.5)
EOSINOPHIL NFR BLD: 2.6 % (ref 0–8)
ERYTHROCYTE [DISTWIDTH] IN BLOOD BY AUTOMATED COUNT: 13.2 % (ref 11.5–14.5)
EST. GFR  (NO RACE VARIABLE): >60 ML/MIN/1.73 M^2
ESTIMATED AVG GLUCOSE: 120 MG/DL (ref 68–131)
GLUCOSE SERPL-MCNC: 109 MG/DL (ref 70–110)
HBA1C MFR BLD: 5.8 % (ref 4–5.6)
HCT VFR BLD AUTO: 39.4 % (ref 37–48.5)
HDLC SERPL-MCNC: 43 MG/DL (ref 40–75)
HDLC SERPL: 26.1 % (ref 20–50)
HGB BLD-MCNC: 13.5 G/DL (ref 12–16)
IMM GRANULOCYTES # BLD AUTO: 0 K/UL (ref 0–0.04)
IMM GRANULOCYTES NFR BLD AUTO: 0 % (ref 0–0.5)
LDLC SERPL CALC-MCNC: 108.8 MG/DL (ref 63–159)
LYMPHOCYTES # BLD AUTO: 2.9 K/UL (ref 1–4.8)
LYMPHOCYTES NFR BLD: 53 % (ref 18–48)
MCH RBC QN AUTO: 31.9 PG (ref 27–31)
MCHC RBC AUTO-ENTMCNC: 34.3 G/DL (ref 32–36)
MCV RBC AUTO: 93 FL (ref 82–98)
MONOCYTES # BLD AUTO: 0.4 K/UL (ref 0.3–1)
MONOCYTES NFR BLD: 7 % (ref 4–15)
NEUTROPHILS # BLD AUTO: 2 K/UL (ref 1.8–7.7)
NEUTROPHILS NFR BLD: 36.8 % (ref 38–73)
NONHDLC SERPL-MCNC: 122 MG/DL
NRBC BLD-RTO: 0 /100 WBC
PLATELET # BLD AUTO: 165 K/UL (ref 150–450)
PMV BLD AUTO: 11.1 FL (ref 9.2–12.9)
POTASSIUM SERPL-SCNC: 4 MMOL/L (ref 3.5–5.1)
PROT SERPL-MCNC: 7 G/DL (ref 6–8.4)
RBC # BLD AUTO: 4.23 M/UL (ref 4–5.4)
SODIUM SERPL-SCNC: 142 MMOL/L (ref 136–145)
TRIGL SERPL-MCNC: 66 MG/DL (ref 30–150)
TSH SERPL DL<=0.005 MIU/L-ACNC: 1.22 UIU/ML (ref 0.4–4)
VIT B12 SERPL-MCNC: 871 PG/ML (ref 210–950)
WBC # BLD AUTO: 5.42 K/UL (ref 3.9–12.7)

## 2023-09-02 PROCEDURE — 85025 COMPLETE CBC W/AUTO DIFF WBC: CPT | Performed by: INTERNAL MEDICINE

## 2023-09-02 PROCEDURE — 82607 VITAMIN B-12: CPT | Performed by: INTERNAL MEDICINE

## 2023-09-02 PROCEDURE — 80053 COMPREHEN METABOLIC PANEL: CPT | Performed by: INTERNAL MEDICINE

## 2023-09-02 PROCEDURE — 84443 ASSAY THYROID STIM HORMONE: CPT | Performed by: INTERNAL MEDICINE

## 2023-09-02 PROCEDURE — 83036 HEMOGLOBIN GLYCOSYLATED A1C: CPT | Performed by: INTERNAL MEDICINE

## 2023-09-02 PROCEDURE — 36415 COLL VENOUS BLD VENIPUNCTURE: CPT | Performed by: INTERNAL MEDICINE

## 2023-09-02 PROCEDURE — 82306 VITAMIN D 25 HYDROXY: CPT | Performed by: INTERNAL MEDICINE

## 2023-09-02 PROCEDURE — 80061 LIPID PANEL: CPT | Performed by: INTERNAL MEDICINE

## 2023-09-05 ENCOUNTER — OFFICE VISIT (OUTPATIENT)
Dept: INTERNAL MEDICINE | Facility: CLINIC | Age: 63
End: 2023-09-05
Payer: COMMERCIAL

## 2023-09-05 VITALS
HEART RATE: 74 BPM | DIASTOLIC BLOOD PRESSURE: 90 MMHG | BODY MASS INDEX: 40.98 KG/M2 | WEIGHT: 255 LBS | SYSTOLIC BLOOD PRESSURE: 148 MMHG | OXYGEN SATURATION: 98 % | HEIGHT: 66 IN

## 2023-09-05 DIAGNOSIS — E55.9 VITAMIN D DEFICIENCY DISEASE: ICD-10-CM

## 2023-09-05 DIAGNOSIS — E53.8 VITAMIN B12 DEFICIENCY: ICD-10-CM

## 2023-09-05 DIAGNOSIS — I10 PRIMARY HYPERTENSION: ICD-10-CM

## 2023-09-05 DIAGNOSIS — E66.01 MORBID OBESITY: ICD-10-CM

## 2023-09-05 DIAGNOSIS — Z12.11 SCREENING FOR MALIGNANT NEOPLASM OF COLON: ICD-10-CM

## 2023-09-05 DIAGNOSIS — E78.5 HYPERLIPIDEMIA, UNSPECIFIED HYPERLIPIDEMIA TYPE: ICD-10-CM

## 2023-09-05 DIAGNOSIS — I89.0 LYMPHEDEMA: ICD-10-CM

## 2023-09-05 DIAGNOSIS — Z12.31 SCREENING MAMMOGRAM FOR BREAST CANCER: ICD-10-CM

## 2023-09-05 DIAGNOSIS — R79.9 ABNORMAL BLOOD CHEMISTRY: ICD-10-CM

## 2023-09-05 DIAGNOSIS — I87.2 VENOUS INSUFFICIENCY: Primary | ICD-10-CM

## 2023-09-05 PROCEDURE — 3288F FALL RISK ASSESSMENT DOCD: CPT | Mod: CPTII,S$GLB,, | Performed by: INTERNAL MEDICINE

## 2023-09-05 PROCEDURE — 1159F MED LIST DOCD IN RCRD: CPT | Mod: CPTII,S$GLB,, | Performed by: INTERNAL MEDICINE

## 2023-09-05 PROCEDURE — 1159F PR MEDICATION LIST DOCUMENTED IN MEDICAL RECORD: ICD-10-PCS | Mod: CPTII,S$GLB,, | Performed by: INTERNAL MEDICINE

## 2023-09-05 PROCEDURE — 3008F BODY MASS INDEX DOCD: CPT | Mod: CPTII,S$GLB,, | Performed by: INTERNAL MEDICINE

## 2023-09-05 PROCEDURE — 3008F PR BODY MASS INDEX (BMI) DOCUMENTED: ICD-10-PCS | Mod: CPTII,S$GLB,, | Performed by: INTERNAL MEDICINE

## 2023-09-05 PROCEDURE — 99397 PR PREVENTIVE VISIT,EST,65 & OVER: ICD-10-PCS | Mod: S$GLB,,, | Performed by: INTERNAL MEDICINE

## 2023-09-05 PROCEDURE — 3044F HG A1C LEVEL LT 7.0%: CPT | Mod: CPTII,S$GLB,, | Performed by: INTERNAL MEDICINE

## 2023-09-05 PROCEDURE — 99397 PER PM REEVAL EST PAT 65+ YR: CPT | Mod: S$GLB,,, | Performed by: INTERNAL MEDICINE

## 2023-09-05 PROCEDURE — 99999 PR PBB SHADOW E&M-EST. PATIENT-LVL V: CPT | Mod: PBBFAC,,, | Performed by: INTERNAL MEDICINE

## 2023-09-05 PROCEDURE — 3080F PR MOST RECENT DIASTOLIC BLOOD PRESSURE >= 90 MM HG: ICD-10-PCS | Mod: CPTII,S$GLB,, | Performed by: INTERNAL MEDICINE

## 2023-09-05 PROCEDURE — 3077F PR MOST RECENT SYSTOLIC BLOOD PRESSURE >= 140 MM HG: ICD-10-PCS | Mod: CPTII,S$GLB,, | Performed by: INTERNAL MEDICINE

## 2023-09-05 PROCEDURE — 99999 PR PBB SHADOW E&M-EST. PATIENT-LVL V: ICD-10-PCS | Mod: PBBFAC,,, | Performed by: INTERNAL MEDICINE

## 2023-09-05 PROCEDURE — 3044F PR MOST RECENT HEMOGLOBIN A1C LEVEL <7.0%: ICD-10-PCS | Mod: CPTII,S$GLB,, | Performed by: INTERNAL MEDICINE

## 2023-09-05 PROCEDURE — 3080F DIAST BP >= 90 MM HG: CPT | Mod: CPTII,S$GLB,, | Performed by: INTERNAL MEDICINE

## 2023-09-05 PROCEDURE — 1101F PT FALLS ASSESS-DOCD LE1/YR: CPT | Mod: CPTII,S$GLB,, | Performed by: INTERNAL MEDICINE

## 2023-09-05 PROCEDURE — 1101F PR PT FALLS ASSESS DOC 0-1 FALLS W/OUT INJ PAST YR: ICD-10-PCS | Mod: CPTII,S$GLB,, | Performed by: INTERNAL MEDICINE

## 2023-09-05 PROCEDURE — 3288F PR FALLS RISK ASSESSMENT DOCUMENTED: ICD-10-PCS | Mod: CPTII,S$GLB,, | Performed by: INTERNAL MEDICINE

## 2023-09-05 PROCEDURE — 3077F SYST BP >= 140 MM HG: CPT | Mod: CPTII,S$GLB,, | Performed by: INTERNAL MEDICINE

## 2023-09-05 RX ORDER — VALSARTAN AND HYDROCHLOROTHIAZIDE 320; 25 MG/1; MG/1
1 TABLET, FILM COATED ORAL DAILY
Qty: 90 TABLET | Refills: 3 | Status: SHIPPED | OUTPATIENT
Start: 2023-09-05 | End: 2024-09-04

## 2023-09-05 RX ORDER — SPIRONOLACTONE 25 MG/1
TABLET ORAL
Qty: 60 TABLET | Refills: 3 | Status: SHIPPED | OUTPATIENT
Start: 2023-09-05 | End: 2024-02-04

## 2023-09-05 RX ORDER — DOXYCYCLINE 100 MG/1
100 CAPSULE ORAL 2 TIMES DAILY
Qty: 20 CAPSULE | Refills: 2 | Status: SHIPPED | OUTPATIENT
Start: 2023-09-05

## 2023-09-05 NOTE — PROGRESS NOTES
CHIEF COMPLAINT: Annual exam    HISTORY OF PRESENT ILLNESS: This is a 63-year-old woman who presents for her annual exam     She wears her compression stockings daily. She continues to have chronic swelling in legs, left greater than right.  No fever, chills, nausea, vomiting, constipation, diarrhea. She has occasional irritation and redness in the lower legs and she starts on doxycycline right away takes care of the problem. No cellulitis recently.      She had a MI/BSO 13 which was complicated by a partial small bowel obstruction and left subclavian DVT. She completed her 3 months of Xarelto. Ultrasound of left upper extremity was fine 2013. NO left arm pain or swelling.       She continues to take valsartan hct 320/25 once daily for hypertension. NO chest pain or shortness of breath. She is taking vitamin D supplement 5000 units daily for her vitamin D deficiency.      She had an angiolipoma 2021 from the left breast            PAST MEDICAL HISTORY:   1. Hypertension  2. History of cellulitis of the right lower extremity 2008.   3. Venous insufficiency   4. Mild vitamin D deficiency.   5. Obesity.    6. Left subclavian DVT 2013 - postoperatively    7. Partial small bowel obstruction after MI/BSO 13    PAST SURGICAL HISTORY:   1. Appendectomy in 2002 with complication of what sounds like small bowel obstruction.   2. Umbilical hernia repair in .   3. D&C 2008 secondary to irregular menstrual periods.    4. MI/BSO and small bowel resection with anastamosis 2013    SOCIAL HISTORY: She quit smoking in , smoked less than a pack of cigarettes daily for 8 years, drinks alcohol once 1-2 times a month. Single, no children. She is an . Now  of [employer] [address].     FAMILY HISTORY: Mother is living with rheumatoid arthritis. Father  of heart problems. She has a sister and three brothers who are healthy. One brother was recently diagnosed with FSGS. Brother  " of complications of drugs     REVIEW OF SYSTEMS: She denies fevers, chills, night sweats, fatigue, visual change, hearing loss, sinus congestion, sore throat, chest pain, shortness of breath, nausea, vomiting, constipation, diarrhea, dysuria, hematuria, polydipsia, polyuria, joint pain, muscle pain, headaches, anxiety, depression, insomnia.       PHYSICAL EXAMINATION:         BP (!) 146/88   Pulse 73   Ht 5' 6" (1.676 m)   Wt 120.7 kg (266 lb 1.5 oz)   LMP 2013   SpO2 98%   BMI 42.95 kg/m²      General: Alert, oriented. No apparent distress. Affect within normal limits.   Conjunctivae anicteric. Tympanic membranes clear. Oropharynx clear.   Neck supple. No cervical lymphadenopathy, no thyroid enlargement.   Respiratory effort normal. Lungs clear to auscultation.   Heart: Regular rate and rhythm without murmurs, gallops or rubs.   ABDOMEN: soft, non distended, non tender, bowel sounds present, no hepatosplenomgaly    ASSESSMENT AND PLAN:       1. Annual exam - discussed diet, exercise and safety issues.  2. Venous insufficiency/lymphedema - compression wrap and compression stockings.  Physical therapy for lymphedema  3. Morbid obesity - work on diet and exercise  4. S/P MI/BSO - doing well - no menopausal symptoms    5. Partial small bowel obstruction - doing well    6.  Left subclavian thrombosis - resolved - completed 3 months of xarelto    7. Hypertension - monitor BP at home. Add spironolactone 25 mg 1-2 tablets daily  8. Vitamin D deficency - vitamin D 5,000 units daily  9. Angiolipoma left breast - removed. MMG 23  Screening - colonoscopy 14 due 10 years. Mammogram 2023. Bone density was normal 2014     I'll see her back in 12 months , sooner if problems arise.      "

## 2023-12-05 ENCOUNTER — CLINICAL SUPPORT (OUTPATIENT)
Dept: REHABILITATION | Facility: HOSPITAL | Age: 63
End: 2023-12-05
Payer: COMMERCIAL

## 2023-12-05 DIAGNOSIS — I89.0 LYMPHEDEMA: ICD-10-CM

## 2023-12-05 DIAGNOSIS — I87.2 VENOUS INSUFFICIENCY: ICD-10-CM

## 2023-12-05 PROCEDURE — 97165 OT EVAL LOW COMPLEX 30 MIN: CPT

## 2023-12-05 PROCEDURE — 97535 SELF CARE MNGMENT TRAINING: CPT

## 2023-12-06 ENCOUNTER — CLINICAL SUPPORT (OUTPATIENT)
Dept: REHABILITATION | Facility: HOSPITAL | Age: 63
End: 2023-12-06
Payer: COMMERCIAL

## 2023-12-06 DIAGNOSIS — I89.0 LYMPHEDEMA: Primary | ICD-10-CM

## 2023-12-06 DIAGNOSIS — I87.2 VENOUS INSUFFICIENCY: ICD-10-CM

## 2023-12-06 PROCEDURE — 29581 APPL MULTLAYER CMPRN SYS LEG: CPT

## 2023-12-06 PROCEDURE — 97140 MANUAL THERAPY 1/> REGIONS: CPT | Mod: 59

## 2023-12-06 NOTE — PROGRESS NOTES
[facility]  Occupational Therapy Treatment Note  Lymphedema    Date: 12/6/2023  Name: Kurtis Gee  Clinic Number: [id number]    Therapy Diagnosis:   Encounter Diagnoses   Name Primary?    Lymphedema Yes    Venous insufficiency      Physician: Misty Clark MD    Physician Orders: Eval and Treat  Medical Diagnosis:   I87.2 (ICD-10-CM) - Venous insufficiency (chronic) (peripheral)   I89.0 (ICD-10-CM) - Lymphedema, not elsewhere classified      Evaluation Date: 12/5/2023  Insurance Authorization Period Expiration: 12/29/2023  Plan of Care Certification Period: 2/27/2024  Visit # / Visits authorized: 1 / 6  FOTO: see media, 1 / 3    Time In: 3:30  Time Out: 4:30  Total Billable Time: 60 minutes (late arrival)    SUBJECTIVE     Pt reports: no concerns. Ready to start treatment.   KURTIS reported wearing compression outside of therapy visits: Yes - knee high 20-30 mm hg stockings  She was compliant with home exercise program given last session.   Response to previous treatment:N/A  Functional change: none    Pain: 0/10        OBJECTIVE     Pt arrived wearing knee high compression stockings, in NAD.    Compression garment status: owns knee high 20-30 mm hg and one inelastic Velcro wrap  Compression Rx status: not requested  Pneumatic pump status: does not own    Objective Measures updated at progress report unless specified.    Treatment     KURTIS received the treatments listed below:     Manual therapy techniques were applied for 50 minutes, including:     MANUAL LYMPHATIC DRAINAGE (MLD):    While supine with LEs elevated, deep abdominal breathing techniques, stimulation along B inguinal regions, drainage of entire BLE tim lower leg, ankle, and foot with return proximally,  Use of Aquaphor due to dryness.      Bandaging was completed for 10 minutes, including:    MULTILAYERED BANDAGING:    Issued supplies and bandaged BLE   Use of Cavilon moisturizer for dry skin  Cotton  stockinet: size 9  Cellona padding from dorsum of foot to knee,   2-6cm 2-8cm and 2-10cm Rosidal K rolls foot to distal knee    Pt to leave intact 24-48 hrs as tolerated, discontinue with any problems, return rolled bandages next session. Wash and wear schedules confirmed.         Patient Education and Home Exercises      Education provided:   - Progress towards goals     Written Home Exercises Provided: Patient instructed to cont prior HEP.  Exercises were reviewed and KURTIS was able to demonstrate them prior to the end of the session.  KURTIS demonstrated good  understanding of the HEP provided. See EMR under Patient Instructions for exercises provided during therapy sessions.       Assessment     Initiated bandaging on BLE. Pt to purchase shoes that accommodate bandages. Vocalized understanding that multi-layer bandaging is required to reduce girth of legs.     Pt would continue to benefit from skilled OT.      KURTIS is progressing well towards her goals and there are no updates to goals at this time. Pt prognosis is Good.     Pt will continue to benefit from skilled outpatient occupational therapy to address the deficits listed in the problem list on initial evaluation provide pt/family education and to maximize pt's level of independence in the home and community environment.     Pt's spiritual, cultural and educational needs considered and pt agreeable to plan of care and goals.    Anticipated barriers to occupational therapy: none    Goals:  Short Term Goals: (4 weeks)  Goals: Progressing / MET   1. Patient will demonstrate 100% knowledge of lymphedema precautions and signs of infection to allow for reduced lymphedema risk, infection risk, and/or exacerbation of condition.  NOT MET   2. Patient will tolerate daily activities wearing multilayered bandaging to allow for lymphatic drainage support, skin elasticity, and reduction in shape and size of limb.  NOT MET   3. Patient and/or caregiver will  order/obtain appropriate compression garments to maintain lymphatic and venous support.  NOT MET   4. Patient will show decreased total girth measurement in BLE by up to 2 cm to allow for lower extremity symmetry, shoe and clothing choice, and ability to apply needed compression. NOT MET      Long Term Goals: (8 weeks)  Goals: Progressing / MET   1. Patient and/or caregiver to yari/doff compression garment independently and with daily compliance to assist in lymphedema management, skin elasticity, and tissue density NOT MET   2. Patient and/or caregiver will be independent in use of pneumatic compression pump or self manual lymphatic drainage techniques to areas within reach to enhance lymphatic drainage and skin condition.  NOT MET   3. Patient to be independent and compliant with home exercise program to allow for increased function in affected limb. NOT MET   4. Patient will show decreased total girth measurement in BLE by up to 4 cm  to allow for lower extremity symmetry, shoe and clothing choice, and ability to apply needed compression daily. NOT MET             PLAN     Updates/Grading for next session: none    Erna Borrego, OT, CLWT

## 2023-12-07 NOTE — PLAN OF CARE
[facility]  Occupational Therapy Initial Evaluation  Lymphedema      Name: Kurtis Gee  Clinic Number: [id number]    Therapy Diagnosis:   Encounter Diagnoses   Name Primary?    Venous insufficiency     Lymphedema      Physician: Misty Clark MD    Physician Orders: Eval and Treat  Medical Diagnosis:   I87.2 (ICD-10-CM) - Venous insufficiency (chronic) (peripheral)   I89.0 (ICD-10-CM) - Lymphedema, not elsewhere classified     Evaluation Date: 12/5/2023  Insurance Authorization Period Expiration: 9/4/2024  Plan of Care Certification Period: 2/27/2024  Visit # / Visits authorized: 1 / 1  FOTO: see media, 1 / 3    Precautions:  Standard    Time In:3:00  Time Out: 3:45  Total Appointment Time (timed & untimed codes): 45 minutes    Subjective      Date of onset: 10 years ago  History of current condition - KURTIS reports: developed swelling in BLE, L>R, 10 years ago after multiple abdominal surgeries (appendectomy, hysterectomy). She has recurrent cellulitis and takes antibiotics approximately 3 x per year. She recently completed a 10 day course and denies active infection at this time. She wears knee high 20-30 mm hg compression stockings daily. She has one Circaid wrap that she purchased recently but has not consistently worn.       Previous Lymphedema Therapy: no  Wears Compression: yes 20-30 mm hg       Imaging: no recent relevant imaging on file.      Pain:  Functional Pain Scale Rating 0-10: did not report this session    Occupation:  yes   Working presently: employed  Duties:     Functional Limitations/Social History:    Previous functional status includes: Independent with all ADLs.   Recent falls: no    Current Functional Status   Home/Living environment: lives with their mother          Limitation of Functional Status as follows:   ADLs/IADLs:     - Feeding: none    - Bathing: none    - Dressing/Grooming: none    - Home Management: none    - Driving:  none      Patient's Goals for Therapy: reduce swelling     Past Medical History/Physical Systems Review:   Marita Gee  has a past medical history of Breast cyst, History of cellulitis , HTN (hypertension), Obesity, Partial small bowel obstruction, Subclavian artery thrombosis, Venous insufficiency, and Vitamin D deficiency disease.    Marita Gee  has a past surgical history that includes Appendectomy (2002); Umbilical hernia repair (2003); Dilation and curettage of uterus (2008); bowel obstruction (2002); Total abdominal hysterectomy w/ bilateral salpingoophorectomy (9/2013); Hysterectomy (09/04/2013); Breast biopsy (Left, 2018); Colonoscopy; and Excisional biopsy (Left, 1/14/2021).    Marita has a current medication list which includes the following prescription(s): cholecalciferol (vitamin d3), doxycycline, fluad quad 2023-24(65y up)(pf), l.acidoph,plant/b.animal,long, multivitamin, spironolactone, and valsartan-hydrochlorothiazide.    Review of patient's allergies indicates:   Allergen Reactions    Bactrim [sulfamethoxazole-trimethoprim]      rash        Pt denies: CHF,CKD, CA, severe PAD  Pt admits medically managed/treated: cellulitis, recently completed, L subclavian DVT    Objective      Patient received from waiting room, wearing compression stockings, in NAD.  Mental status: alert, oriented to person, place, and time  Appearance: Well groomed  Behavior:  calm and cooperative  Attention Span and Concentration:  Normal    Affected Areas: RLE and LLE  Refill: Day   Stemmer Sign: positive  Shape: increased girth at ankles  Tissue Texture: firm, pitting with prolonged pressure  Skin Integrity: hemosiderin staining and hyperpigmentation  Sensation: intact  Circulation: cbcirculation: intact      LE Girth Measurements: taken in supine   LANDMARK RIGHT LE  12/5/23 LEFT LE  12/5/23   SBP - -   10 below SBP 42.5 cm 44.5 cm   20 below SBP 42.0 cm 45.0 cm   30 below SBP 31.5 cm 37.0 cm   35 below SBP 28.0  cm 31.0 cm   Ankle 33.0 cm 36.5 cm   Forefoot 25.5 cm 27.0 cm       Picture of BLE in supine; taken via [employer] on therapist's cell phone with verbal consent from patient:      Limitation/Restriction for FOTO Lower Quadrant Edema Survey    Therapist reviewed FOTO scores for Kurtis Gee on 12/5/2023.   FOTO documents entered into [employer] - see Media section.    Limitation Score: 26%         Treatment      Total Treatment time (time-based codes) separate from Evaluation: 10 minutes    KURTIS received the treatments listed below:        Self-care home management techniques to improve functional performance for 10  minutes, including:  Pt was educated in potential compression needs.  Demo of products including socks, garments, and Inelastic Velcro wraps.   Discussed cost/coverage and authorization per insurance with Durable Medical Equipment(DME) provider.  Compression require orders from referring provider and coverage or purchase of products from DME or self order.      Discussed wear schedule, don/doff, wash and management of products.  Size and compression class and AM/PM needs.    Consideration for Edema Wear as form of temporary compression use until securing compression needs. .    Informed insurance coverage of compression is per DME provider and typically Medicare and Medicare group plans may not cover cost beyond pair of standard sized knee high garments.   Commitment to attendance as well as commitment to securing compression needs is critical to edema management.     Patient Education and Home Exercises      Education provided:   1. Educated on definition of lymphedema.  2. Explained the Complete Decongestive Therapy protocol in depth  3. Educated on Phase 1 and 2 of protocol.  4. Reviewed treatment frequency and likely duration of weeks  5.Contraindications for treatment.  6. Plan of care and goals.  7. Educated on home management protocols.   8. Role of OT, goals for OT, scheduling/cancellations,  insurance limitations.  9. Provided lymphedema educational pamphlet      Written Home Exercises Provided: yes.  Exercises were reviewed and KURTIS was able to demonstrate them prior to the end of the session.    KURTIS demonstrated good  understanding of the education provided.     Pt was advised to perform these exercises in compression, free of pain, and to stop performing them if pain occurs.    See EMR under Patient Instructions for exercises provided 12/5/2023.    Assessment      Kurtis is a 63 y.o. female referred to outpatient Occupational Therapy with a medical diagnosis of lymphedema. This patient presents with stage 2 lymphedema due to CVI.  Patient's deficits include swelling of the BLE, increased pain, increased stiffness in the BLE, as well as difficulty performing ADLs , and placing the pt at higher risk of infection. Therapist's recommendation includes elevation, exercise, manual lymphatic drainage, pneumatic compression pump, multi-layer bandaging, and compression garments for 8 weeks to reduce swelling. Kurtis would benefit from complete decongestive therapy to reduce size of BLE, reduce risk of wounds and poor skin integrity as well as education to self-maintain reduction with use of compression garments.    Presented with stage 2 lymphedema 2/2 CVI. Pt is compliant with knee high 20-30 mm hg compression though plan to assess current stocking containment after reductions are achieved with bandaging.     Anticipated barriers to occupational therapy: none    Plan of care discussed with patient: Yes  Patient's spiritual, cultural and educational needs considered and patient is agreeable to the plan of care and goals as stated below:     Medical Necessity is demonstrated by the following  Occupational Profile/History  Co-morbidities and personal factors that may impact the plan of care [x] LOW: Brief chart review  [] MODERATE: Expanded chart review   [] HIGH: Extensive chart review    Moderate  / High Support Documentation: N/A     Examination  Performance deficits relating to physical, cognitive or psychosocial skills that result in activity limitations and/or participation restrictions  [x] LOW: addressing 1-3 Performance deficits  [] MODERATE: 3-5 Performance deficits  [] HIGH: 5+ Performance deficits (please support below)    Moderate / High Support Documentation:    Physical:  Skin Integrity/Scar Formation  Edema    Cognitive:  No Deficits    Psychosocial:    No Deficits     Treatment Options [] LOW: Limited options  [] MODERATE: Several options  [x] HIGH: Multiple options      Decision Making/ Complexity Score: low       The following goals were discussed with the patient and patient is in agreement with them as to be addressed in the treatment plan.     Goals:     Short Term Goals: (4 weeks)  Goals: Progressing / MET   1. Patient will demonstrate 100% knowledge of lymphedema precautions and signs of infection to allow for reduced lymphedema risk, infection risk, and/or exacerbation of condition.  NOT MET   2. Patient will tolerate daily activities wearing multilayered bandaging to allow for lymphatic drainage support, skin elasticity, and reduction in shape and size of limb.  NOT MET   3. Patient and/or caregiver will order/obtain appropriate compression garments to maintain lymphatic and venous support.  NOT MET   4. Patient will show decreased total girth measurement in BLE by up to 2 cm to allow for lower extremity symmetry, shoe and clothing choice, and ability to apply needed compression. NOT MET     Long Term Goals: (8 weeks)  Goals: Progressing / MET   1. Patient and/or caregiver to yari/doff compression garment independently and with daily compliance to assist in lymphedema management, skin elasticity, and tissue density NOT MET   2. Patient and/or caregiver will be independent in use of pneumatic compression pump or self manual lymphatic drainage techniques to areas within reach to enhance  lymphatic drainage and skin condition.  NOT MET   3. Patient to be independent and compliant with home exercise program to allow for increased function in affected limb. NOT MET   4. Patient will show decreased total girth measurement in BLE by up to 4 cm  to allow for lower extremity symmetry, shoe and clothing choice, and ability to apply needed compression daily. NOT MET        Plan     Plan of Care Certification: 12/5/2023 to 2/27/2024.     Therapy Track: COMPLETE DECONGESTIVE THERAPY  Interventions: manual lymphatic drainage, multi-layer bandaging, compression garments, therapeutic exercise, self bandaging and manual lymphatic drainage training, home exercise programming.      Outpatient Occupational Therapy 2 times weekly for 8 weeks to include the following interventions: Manual therapy/joint mobilizations, Therapeutic exercises/activities., and Edema Control.    Erna Borrego, OT, CLWT      I CERTIFY THE NEED FOR THESE SERVICES FURNISHED UNDER THIS PLAN OF TREATMENT AND WHILE UNDER MY CARE   Physician's comments:     Physician's Signature: ___________________________________________________

## 2023-12-13 ENCOUNTER — CLINICAL SUPPORT (OUTPATIENT)
Dept: REHABILITATION | Facility: HOSPITAL | Age: 63
End: 2023-12-13
Payer: COMMERCIAL

## 2023-12-13 DIAGNOSIS — I89.0 LYMPHEDEMA: Primary | ICD-10-CM

## 2023-12-13 PROCEDURE — 97140 MANUAL THERAPY 1/> REGIONS: CPT

## 2023-12-13 PROCEDURE — 29581 APPL MULTLAYER CMPRN SYS LEG: CPT

## 2023-12-13 NOTE — PROGRESS NOTES
[facility]  Occupational Therapy Treatment Note  Lymphedema    Date: 12/13/2023  Name: Kurtis Gee  Clinic Number: [id number]    Therapy Diagnosis:   Encounter Diagnosis   Name Primary?    Lymphedema Yes     Physician: Misty Clark MD    Physician Orders: Eval and Treat  Medical Diagnosis:   I87.2 (ICD-10-CM) - Venous insufficiency (chronic) (peripheral)   I89.0 (ICD-10-CM) - Lymphedema, not elsewhere classified      Evaluation Date: 12/5/2023  Insurance Authorization Period Expiration: 12/29/2023  Plan of Care Certification Period: 2/27/2024  Visit # / Visits authorized: 2 / 6  FOTO: see media, 1 / 3    Time In: 4:10  Time Out: 4:58  Total Billable Time: 48 minutes (late arrival)    SUBJECTIVE     Pt reports: tolerated bandages. Edema slightly improved.   KURTIS reported wearing compression outside of therapy visits: Yes - knee high 20-30 mm hg stockings  She was compliant with home exercise program given last session.   Response to previous treatment: increased wrinkling, decreased girth observed   Functional change: none    Pain: 0/10        OBJECTIVE     Pt arrived wearing knee high compression stockings, in NAD.    Compression garment status: owns knee high 20-30 mm hg and one inelastic Velcro wrap  Compression Rx status: not requested  Pneumatic pump status: does not own    Objective Measures updated at progress report unless specified.    Treatment     KURTIS received the treatments listed below:     Manual therapy techniques were applied for 39 minutes, including:     MANUAL LYMPHATIC DRAINAGE (MLD):    While supine with LEs elevated, deep abdominal breathing techniques, stimulation along B inguinal regions, drainage of entire BLE tim lower leg, ankle, and foot with return proximally,  Use of Aquaphor due to dryness.      Bandaging was completed for 9 minutes, including:    MULTILAYERED BANDAGING:    Issued supplies and bandaged BLE   Use of Cavilon moisturizer  for dry skin  Cotton stockinet: size 9  Cellona padding from dorsum of foot to knee,   2-6cm 2-8cm and 2-10cm Rosidal K rolls foot to distal knee    Pt to leave intact 48 hrs as tolerated, discontinue with any problems, return rolled bandages next session. Wash and wear schedules confirmed.         Patient Education and Home Exercises      Education provided:   - Progress towards goals     Written Home Exercises Provided: Patient instructed to cont prior HEP.  Exercises were reviewed and KURTIS was able to demonstrate them prior to the end of the session.  KURTIS demonstrated good  understanding of the HEP provided. See EMR under Patient Instructions for exercises provided during therapy sessions.       Assessment     BLE appear smaller in girth as evidence by wrinkling. Pump demo in clinic 12/20/23.     Pt would continue to benefit from skilled OT.      KURTIS is progressing well towards her goals and there are no updates to goals at this time. Pt prognosis is Good.     Pt will continue to benefit from skilled outpatient occupational therapy to address the deficits listed in the problem list on initial evaluation provide pt/family education and to maximize pt's level of independence in the home and community environment.     Pt's spiritual, cultural and educational needs considered and pt agreeable to plan of care and goals.    Anticipated barriers to occupational therapy: none    Goals:  Short Term Goals: (4 weeks)  Goals: Progressing / MET   1. Patient will demonstrate 100% knowledge of lymphedema precautions and signs of infection to allow for reduced lymphedema risk, infection risk, and/or exacerbation of condition.  NOT MET   2. Patient will tolerate daily activities wearing multilayered bandaging to allow for lymphatic drainage support, skin elasticity, and reduction in shape and size of limb.  NOT MET   3. Patient and/or caregiver will order/obtain appropriate compression garments to maintain lymphatic  and venous support.  NOT MET   4. Patient will show decreased total girth measurement in BLE by up to 2 cm to allow for lower extremity symmetry, shoe and clothing choice, and ability to apply needed compression. NOT MET      Long Term Goals: (8 weeks)  Goals: Progressing / MET   1. Patient and/or caregiver to yari/doff compression garment independently and with daily compliance to assist in lymphedema management, skin elasticity, and tissue density NOT MET   2. Patient and/or caregiver will be independent in use of pneumatic compression pump or self manual lymphatic drainage techniques to areas within reach to enhance lymphatic drainage and skin condition.  NOT MET   3. Patient to be independent and compliant with home exercise program to allow for increased function in affected limb. NOT MET   4. Patient will show decreased total girth measurement in BLE by up to 4 cm  to allow for lower extremity symmetry, shoe and clothing choice, and ability to apply needed compression daily. NOT MET             PLAN     Updates/Grading for next session: none    Erna Borrego, OT, CLWT

## 2023-12-20 ENCOUNTER — CLINICAL SUPPORT (OUTPATIENT)
Dept: REHABILITATION | Facility: HOSPITAL | Age: 63
End: 2023-12-20
Payer: COMMERCIAL

## 2023-12-20 DIAGNOSIS — I89.0 LYMPHEDEMA: Primary | ICD-10-CM

## 2023-12-20 PROCEDURE — 29581 APPL MULTLAYER CMPRN SYS LEG: CPT

## 2023-12-20 PROCEDURE — 97535 SELF CARE MNGMENT TRAINING: CPT | Mod: 59

## 2023-12-20 PROCEDURE — 97016 VASOPNEUMATIC DEVICE THERAPY: CPT

## 2023-12-20 NOTE — PROGRESS NOTES
[facility]  Occupational Therapy Treatment Note  Lymphedema    Date: 12/20/2023  Name: Kurtis Gee  Clinic Number: [id number]    Therapy Diagnosis:   Encounter Diagnosis   Name Primary?    Lymphedema Yes       Physician: Misty Clark MD    Physician Orders: Eval and Treat  Medical Diagnosis:   I87.2 (ICD-10-CM) - Venous insufficiency (chronic) (peripheral)   I89.0 (ICD-10-CM) - Lymphedema, not elsewhere classified      Evaluation Date: 12/5/2023  Insurance Authorization Period Expiration: 12/29/2023  Plan of Care Certification Period: 2/27/2024  Visit # / Visits authorized: 2 / 6  FOTO: see media, 1 / 3    Time In: 5:00  Time Out: 6:20  Total Billable Time: 40 minutes (medical rep present for pump demo)    SUBJECTIVE     Pt reports: prefers basic pump to advanced pump.   KURTIS reported wearing compression outside of therapy visits: Yes - knee high 20-30 mm hg stockings  She was compliant with home exercise program given last session.   Response to previous treatment: increased wrinkling, decreased girth observed   Functional change: none    Pain: 0/10        OBJECTIVE     Pt arrived wearing knee high compression stockings, in NAD.    Compression garment status: owns knee high 20-30 mm hg and one inelastic Velcro wrap  Compression Rx status: not requested  Pneumatic pump status: does not own    Objective Measures updated at progress report unless specified.    Treatment     KURTIS received the treatments listed below:     Self-care/home management techniques were completed for 15 minutes, including:    Provided product information for 30-40 mm hg knee high stockings and inelastic Velcro foot/ankle garments  Educated pt on necessity of locating a stronger compression garment given refill in current stockings.     SEQUENTIAL COMPRESSION PUMP: full leg sleeve applied to L LE  VES 5200 with default setting with distal pressures starting at 45mmHg entire LE     Bandaging was  completed for 8 minutes, including:    MULTILAYERED BANDAGING:    Issued supplies and bandaged BLE   Use of Cavilon moisturizer for dry skin  Cotton stockinet: size 9  Cellona padding from dorsum of foot to knee,   2-6cm 2-8cm and 2-10cm Rosidal K rolls foot to distal knee    Pt to leave intact 48 hrs as tolerated, discontinue with any problems, return rolled bandages next session. Wash and wear schedules confirmed.     [employer]  present for pneumatic pump demonstration on RLE.    Patient Education and Home Exercises      Education provided:   - Progress towards goals     Written Home Exercises Provided: Patient instructed to cont prior HEP.  Exercises were reviewed and KURTIS was able to demonstrate them prior to the end of the session.  KURTIS demonstrated good  understanding of the HEP provided. See EMR under Patient Instructions for exercises provided during therapy sessions.       Assessment     [employer]  present for pneumatic pump demonstration on RLE. After complete, clinic pump donned to LLE. Provided product information for 30-40 mm hg stockings as well as Velcro foot/ankle garments given refill in current 20-30 mm hg stockings. Pt to self purchase to assess containment.     Pt would continue to benefit from skilled OT.      KURTIS is progressing well towards her goals and there are no updates to goals at this time. Pt prognosis is Good.     Pt will continue to benefit from skilled outpatient occupational therapy to address the deficits listed in the problem list on initial evaluation provide pt/family education and to maximize pt's level of independence in the home and community environment.     Pt's spiritual, cultural and educational needs considered and pt agreeable to plan of care and goals.    Anticipated barriers to occupational therapy: none    Goals:  Short Term Goals: (4 weeks)  Goals: Progressing / MET   1. Patient will demonstrate 100% knowledge of  lymphedema precautions and signs of infection to allow for reduced lymphedema risk, infection risk, and/or exacerbation of condition.  NOT MET   2. Patient will tolerate daily activities wearing multilayered bandaging to allow for lymphatic drainage support, skin elasticity, and reduction in shape and size of limb.  NOT MET   3. Patient and/or caregiver will order/obtain appropriate compression garments to maintain lymphatic and venous support.  NOT MET   4. Patient will show decreased total girth measurement in BLE by up to 2 cm to allow for lower extremity symmetry, shoe and clothing choice, and ability to apply needed compression. NOT MET      Long Term Goals: (8 weeks)  Goals: Progressing / MET   1. Patient and/or caregiver to yari/doff compression garment independently and with daily compliance to assist in lymphedema management, skin elasticity, and tissue density NOT MET   2. Patient and/or caregiver will be independent in use of pneumatic compression pump or self manual lymphatic drainage techniques to areas within reach to enhance lymphatic drainage and skin condition.  NOT MET   3. Patient to be independent and compliant with home exercise program to allow for increased function in affected limb. NOT MET   4. Patient will show decreased total girth measurement in BLE by up to 4 cm  to allow for lower extremity symmetry, shoe and clothing choice, and ability to apply needed compression daily. NOT MET             PLAN     Updates/Grading for next session: none    Erna Borrego, OT, CLWT

## 2023-12-21 ENCOUNTER — TELEPHONE (OUTPATIENT)
Dept: INTERNAL MEDICINE | Facility: CLINIC | Age: 63
End: 2023-12-21
Payer: COMMERCIAL

## 2023-12-21 DIAGNOSIS — I87.2 VENOUS INSUFFICIENCY: Primary | ICD-10-CM

## 2023-12-21 NOTE — TELEPHONE ENCOUNTER
Orders placed for compression stockings - please find out where patient wants the order to go/fax?

## 2023-12-21 NOTE — TELEPHONE ENCOUNTER
----- Message from Erna Borrego OT sent at 12/20/2023  6:19 PM CST -----  Regarding: Compression Rx  Hi Dr. Clark,    Can you please place an order for 30-40 mm hg knee high compression stockings?    Thank you!  Erna Borrego OT, CLWT

## 2023-12-22 ENCOUNTER — TELEPHONE (OUTPATIENT)
Dept: INTERNAL MEDICINE | Facility: CLINIC | Age: 63
End: 2023-12-22
Payer: COMMERCIAL

## 2023-12-22 NOTE — TELEPHONE ENCOUNTER
----- Message from Nisha Weldon MA sent at 12/22/2023  2:41 PM CST -----  Austin from [employer] calling to inform staff that he is faxing over a prescription for the patient to be filled out for a pump. If any questions or concerns please give him a call back at [phone].    Fax form back to [phone]

## 2024-01-04 ENCOUNTER — CLINICAL SUPPORT (OUTPATIENT)
Dept: REHABILITATION | Facility: HOSPITAL | Age: 64
End: 2024-01-04
Payer: COMMERCIAL

## 2024-01-04 DIAGNOSIS — I89.0 LYMPHEDEMA: Primary | ICD-10-CM

## 2024-01-04 PROCEDURE — 29581 APPL MULTLAYER CMPRN SYS LEG: CPT

## 2024-01-04 PROCEDURE — 97140 MANUAL THERAPY 1/> REGIONS: CPT

## 2024-01-04 NOTE — PROGRESS NOTES
[facility]  Occupational Therapy Treatment Note  Lymphedema    Date: 1/4/2024  Name: Kurtis Gee  Clinic Number: [id number]    Therapy Diagnosis:   Encounter Diagnosis   Name Primary?    Lymphedema Yes       Physician: Misty Clark MD    Physician Orders: Eval and Treat  Medical Diagnosis:   I87.2 (ICD-10-CM) - Venous insufficiency (chronic) (peripheral)   I89.0 (ICD-10-CM) - Lymphedema, not elsewhere classified      Evaluation Date: 12/5/2023  Insurance Authorization Period Expiration: 12/31/2024  Plan of Care Certification Period: 2/27/2024  Visit # / Visits authorized: 1 / 20  FOTO: see media, 1 / 3    Time In: 4:34  Time Out: 5:24  Total Billable Time: 50 minutes     SUBJECTIVE     Pt reports: swelling refills when out of bandages. Did not purchase foot/ankle velcro wraps yet but will.  KURTIS reported wearing compression outside of therapy visits: Yes - knee high 20-30 mm hg stockings  She was compliant with home exercise program given last session.   Response to previous treatment: increased wrinkling, decreased girth observed   Functional change: none    Pain: 0/10        OBJECTIVE     Pt arrived wearing knee high compression stockings, in NAD.    Compression garment status: owns knee high 20-30 mm hg and one inelastic Velcro wrap  Compression Rx status: not requested  Pneumatic pump status: does not own    Objective Measures updated at progress report unless specified.    Treatment     KURTIS received the treatments listed below:     Manual therapy techniques were completed for 41 minutes, including:    MANUAL LYMPHATIC DRAINAGE (MLD):    While supine with LEs elevated, deep abdominal breathing techniques, stimulation along B inguinal regions, drainage of entire BLE tim lower leg, ankle, and foot with return proximally,  Use of Aquaphor due to dryness.      Bandaging was completed for 9 minutes, including:    MULTILAYERED BANDAGING:    Issued supplies and bandaged  BLE   Use of Cavilon moisturizer for dry skin  Cotton stockinet: size 9  Cellona padding from dorsum of foot to knee,   2-6cm 2-8cm and 2-10cm Rosidal K rolls foot to distal knee    Pt to leave intact 48 hrs as tolerated, discontinue with any problems, return rolled bandages next session. Wash and wear schedules confirmed.       Patient Education and Home Exercises      Education provided:   - Progress towards goals     Written Home Exercises Provided: Patient instructed to cont prior HEP.  Exercises were reviewed and KURTIS was able to demonstrate them prior to the end of the session.  KURTIS demonstrated good  understanding of the HEP provided. See EMR under Patient Instructions for exercises provided during therapy sessions.       Assessment     Pt will purchase foot/ankle Velcro wraps. No significant changes observed this session.     Pt would continue to benefit from skilled OT.      KURTIS is progressing well towards her goals and there are no updates to goals at this time. Pt prognosis is Good.     Pt will continue to benefit from skilled outpatient occupational therapy to address the deficits listed in the problem list on initial evaluation provide pt/family education and to maximize pt's level of independence in the home and community environment.     Pt's spiritual, cultural and educational needs considered and pt agreeable to plan of care and goals.    Anticipated barriers to occupational therapy: none    Goals:  Short Term Goals: (4 weeks)  Goals: Progressing / MET   1. Patient will demonstrate 100% knowledge of lymphedema precautions and signs of infection to allow for reduced lymphedema risk, infection risk, and/or exacerbation of condition.  NOT MET   2. Patient will tolerate daily activities wearing multilayered bandaging to allow for lymphatic drainage support, skin elasticity, and reduction in shape and size of limb.  NOT MET   3. Patient and/or caregiver will order/obtain appropriate  compression garments to maintain lymphatic and venous support.  NOT MET   4. Patient will show decreased total girth measurement in BLE by up to 2 cm to allow for lower extremity symmetry, shoe and clothing choice, and ability to apply needed compression. NOT MET      Long Term Goals: (8 weeks)  Goals: Progressing / MET   1. Patient and/or caregiver to yari/doff compression garment independently and with daily compliance to assist in lymphedema management, skin elasticity, and tissue density NOT MET   2. Patient and/or caregiver will be independent in use of pneumatic compression pump or self manual lymphatic drainage techniques to areas within reach to enhance lymphatic drainage and skin condition.  NOT MET   3. Patient to be independent and compliant with home exercise program to allow for increased function in affected limb. NOT MET   4. Patient will show decreased total girth measurement in BLE by up to 4 cm  to allow for lower extremity symmetry, shoe and clothing choice, and ability to apply needed compression daily. NOT MET             PLAN     Updates/Grading for next session: none    Erna Borrego, OT, CLWT

## 2024-01-10 ENCOUNTER — CLINICAL SUPPORT (OUTPATIENT)
Dept: REHABILITATION | Facility: HOSPITAL | Age: 64
End: 2024-01-10
Payer: COMMERCIAL

## 2024-01-10 DIAGNOSIS — I89.0 LYMPHEDEMA: Primary | ICD-10-CM

## 2024-01-10 PROCEDURE — 97140 MANUAL THERAPY 1/> REGIONS: CPT | Mod: 59

## 2024-01-10 PROCEDURE — 29581 APPL MULTLAYER CMPRN SYS LEG: CPT

## 2024-01-10 NOTE — PROGRESS NOTES
[facility]  Occupational Therapy Treatment Note  Lymphedema    Date: 1/10/2024  Name: Kurtis Gee  Clinic Number: [id number]    Therapy Diagnosis:   Encounter Diagnosis   Name Primary?    Lymphedema Yes       Physician: Misty Clark MD    Physician Orders: Eval and Treat  Medical Diagnosis:   I87.2 (ICD-10-CM) - Venous insufficiency (chronic) (peripheral)   I89.0 (ICD-10-CM) - Lymphedema, not elsewhere classified      Evaluation Date: 12/5/2023  Insurance Authorization Period Expiration: 12/31/2024  Plan of Care Certification Period: 2/27/2024  Visit # / Visits authorized: 2 / 20  FOTO: see media, 1 / 3    Time In: 4:30  Time Out: 5:25  Total Billable Time: 50 minutes (pt in bathroom)    SUBJECTIVE     Pt reports: purchased foot/ankle garments. Edema improving.   KURTIS reported wearing compression outside of therapy visits: Yes - knee high 20-30 mm hg stockings  She was compliant with home exercise program given last session.   Response to previous treatment: increased wrinkling, decreased girth observed   Functional change: none    Pain: 0/10        OBJECTIVE     Pt arrived wearing knee high compression stockings, in NAD.    Compression garment status: owns knee high 20-30 mm hg and one inelastic Velcro wrap  Compression Rx status: not requested  Pneumatic pump status: does not own    Objective Measures updated at progress report unless specified.    Treatment     KURTIS received the treatments listed below:     Manual therapy techniques were completed for 42 minutes, including:    MANUAL LYMPHATIC DRAINAGE (MLD):    While supine with LEs elevated, deep abdominal breathing techniques, stimulation along B inguinal regions, drainage of entire BLE tim lower leg, ankle, and foot with return proximally,  Use of Aquaphor due to dryness.      Bandaging was completed for 8 minutes, including:    MULTILAYERED BANDAGING:    Issued supplies and bandaged BLE   Use of Cavilon  moisturizer for dry skin  Cotton stockinet: size 9  Cellona padding from dorsum of foot to knee,   2-6cm 2-8cm and 2-10cm Rosidal K rolls foot to distal knee    Pt to leave intact 48 hrs as tolerated, discontinue with any problems, return rolled bandages next session. Wash and wear schedules confirmed.       Patient Education and Home Exercises      Education provided:   - Progress towards goals     Written Home Exercises Provided: Patient instructed to cont prior HEP.  Exercises were reviewed and KURTIS was able to demonstrate them prior to the end of the session.  KURTIS demonstrated good  understanding of the HEP provided. See EMR under Patient Instructions for exercises provided during therapy sessions.       Assessment     Edema improving. Pt to wear velcro foot/ankle garments over stockings once delivered.     Pt would continue to benefit from skilled OT.      KURTIS is progressing well towards her goals and there are no updates to goals at this time. Pt prognosis is Good.     Pt will continue to benefit from skilled outpatient occupational therapy to address the deficits listed in the problem list on initial evaluation provide pt/family education and to maximize pt's level of independence in the home and community environment.     Pt's spiritual, cultural and educational needs considered and pt agreeable to plan of care and goals.    Anticipated barriers to occupational therapy: none    Goals:  Short Term Goals: (4 weeks)  Goals: Progressing / MET   1. Patient will demonstrate 100% knowledge of lymphedema precautions and signs of infection to allow for reduced lymphedema risk, infection risk, and/or exacerbation of condition.  NOT MET   2. Patient will tolerate daily activities wearing multilayered bandaging to allow for lymphatic drainage support, skin elasticity, and reduction in shape and size of limb.  NOT MET   3. Patient and/or caregiver will order/obtain appropriate compression garments to  maintain lymphatic and venous support.  NOT MET   4. Patient will show decreased total girth measurement in BLE by up to 2 cm to allow for lower extremity symmetry, shoe and clothing choice, and ability to apply needed compression. NOT MET      Long Term Goals: (8 weeks)  Goals: Progressing / MET   1. Patient and/or caregiver to yari/doff compression garment independently and with daily compliance to assist in lymphedema management, skin elasticity, and tissue density NOT MET   2. Patient and/or caregiver will be independent in use of pneumatic compression pump or self manual lymphatic drainage techniques to areas within reach to enhance lymphatic drainage and skin condition.  NOT MET   3. Patient to be independent and compliant with home exercise program to allow for increased function in affected limb. NOT MET   4. Patient will show decreased total girth measurement in BLE by up to 4 cm  to allow for lower extremity symmetry, shoe and clothing choice, and ability to apply needed compression daily. NOT MET             PLAN     Updates/Grading for next session: none    Erna Borrego, OT, CLWT

## 2024-01-22 ENCOUNTER — CLINICAL SUPPORT (OUTPATIENT)
Dept: REHABILITATION | Facility: HOSPITAL | Age: 64
End: 2024-01-22
Payer: COMMERCIAL

## 2024-01-22 DIAGNOSIS — I89.0 LYMPHEDEMA: Primary | ICD-10-CM

## 2024-01-22 PROCEDURE — 97140 MANUAL THERAPY 1/> REGIONS: CPT | Mod: 59

## 2024-01-22 PROCEDURE — 29581 APPL MULTLAYER CMPRN SYS LEG: CPT

## 2024-01-22 NOTE — PROGRESS NOTES
[facility]  Occupational Therapy Treatment Note  Lymphedema    Date: 1/22/2024  Name: Kurtis Gee  Clinic Number: [id number]    Therapy Diagnosis:   Encounter Diagnosis   Name Primary?    Lymphedema Yes       Physician: Misty Clark MD    Physician Orders: Eval and Treat  Medical Diagnosis:   I87.2 (ICD-10-CM) - Venous insufficiency (chronic) (peripheral)   I89.0 (ICD-10-CM) - Lymphedema, not elsewhere classified      Evaluation Date: 12/5/2023  Insurance Authorization Period Expiration: 12/31/2024  Plan of Care Certification Period: 2/27/2024  Visit # / Visits authorized: 3 / 20  FOTO: see media, 1 / 3    Time In: 3:42  Time Out: 4:30  Total Billable Time: 38 minutes (late arrival)    SUBJECTIVE     Pt reports: foot/ankle garments arrived but too big. 20-30 mm hg stockings too long, will need to exchange both.  KURTIS reported wearing compression outside of therapy visits: Yes - knee high 20-30 mm hg stockings  She was compliant with home exercise program given last session.   Response to previous treatment: increased wrinkling, decreased girth observed   Functional change: none    Pain: 0/10        OBJECTIVE     Pt arrived wearing knee high compression stockings, in NAD.    Compression garment status: owns knee high 20-30 mm hg and one inelastic Velcro wrap  Compression Rx status: not requested  Pneumatic pump status: does not own    LE Girth Measurements: taken in supine   LANDMARK RIGHT LE  12/5/23 LEFT LE  12/5/23 RIGHT LE  1/22/24 LEFT LE  1/22/24   SBP - - - -   10 below SBP 42.5 cm 44.5 cm 41.0 cm 44.0 cm   20 below SBP 42.0 cm 45.0 cm 42.0 cm 43.0 cm   30 below SBP 31.5 cm 37.0 cm 32.5 cm 36.5 cm   35 below SBP 28.0 cm 31.0 cm 28.5 cm 30.0 cm   Ankle 33.0 cm 36.5 cm 32.0 cm 34.0 cm   Forefoot 25.5 cm 27.0 cm 24.5 cm 24.5 cm         Treatment     KURTIS received the treatments listed below:     Manual therapy techniques were completed for 30 minutes,  including:    MANUAL LYMPHATIC DRAINAGE (MLD):    While supine with LEs elevated, deep abdominal breathing techniques, stimulation along B inguinal regions, drainage of entire BLE tim lower leg, ankle, and foot with return proximally,  Use of Aquaphor due to dryness.      Bandaging was completed for 9 minutes, including:    MULTILAYERED BANDAGING:    Issued supplies and bandaged BLE   Use of Cavilon moisturizer for dry skin  Cotton stockinet: size 9  Cellona padding from dorsum of foot to knee,   2-6cm 2-8cm and 2-10cm Rosidal K rolls foot to distal knee    Pt to leave intact 48 hrs as tolerated, discontinue with any problems, return rolled bandages next session. Wash and wear schedules confirmed.       Patient Education and Home Exercises      Education provided:   - Progress towards goals     Written Home Exercises Provided: Patient instructed to cont prior HEP.  Exercises were reviewed and KURTIS was able to demonstrate them prior to the end of the session.  KURTIS demonstrated good  understanding of the HEP provided. See EMR under Patient Instructions for exercises provided during therapy sessions.       Assessment     Foot/ankle wraps too big, pt to exchange for smaller size. Edema improving but will require stronger compression to prevent refill.     Pt would continue to benefit from skilled OT.      KURTIS is progressing well towards her goals and there are no updates to goals at this time. Pt prognosis is Good.     Pt will continue to benefit from skilled outpatient occupational therapy to address the deficits listed in the problem list on initial evaluation provide pt/family education and to maximize pt's level of independence in the home and community environment.     Pt's spiritual, cultural and educational needs considered and pt agreeable to plan of care and goals.    Anticipated barriers to occupational therapy: none    Goals:  Short Term Goals: (4 weeks)  Goals: Progressing / MET   1. Patient  will demonstrate 100% knowledge of lymphedema precautions and signs of infection to allow for reduced lymphedema risk, infection risk, and/or exacerbation of condition.  NOT MET   2. Patient will tolerate daily activities wearing multilayered bandaging to allow for lymphatic drainage support, skin elasticity, and reduction in shape and size of limb.  NOT MET   3. Patient and/or caregiver will order/obtain appropriate compression garments to maintain lymphatic and venous support.  NOT MET   4. Patient will show decreased total girth measurement in BLE by up to 2 cm to allow for lower extremity symmetry, shoe and clothing choice, and ability to apply needed compression. NOT MET      Long Term Goals: (8 weeks)  Goals: Progressing / MET   1. Patient and/or caregiver to yari/doff compression garment independently and with daily compliance to assist in lymphedema management, skin elasticity, and tissue density NOT MET   2. Patient and/or caregiver will be independent in use of pneumatic compression pump or self manual lymphatic drainage techniques to areas within reach to enhance lymphatic drainage and skin condition.  NOT MET   3. Patient to be independent and compliant with home exercise program to allow for increased function in affected limb. NOT MET   4. Patient will show decreased total girth measurement in BLE by up to 4 cm  to allow for lower extremity symmetry, shoe and clothing choice, and ability to apply needed compression daily. NOT MET             PLAN     Updates/Grading for next session: none    Erna Borrego, OT, CLWT

## 2024-02-02 NOTE — TELEPHONE ENCOUNTER
No care due was identified.  [facility] Embedded Care Due Messages. Reference number: [id number].   2/02/2024 4:06:13 PM CST

## 2024-02-04 RX ORDER — SPIRONOLACTONE 25 MG/1
TABLET ORAL
Qty: 180 TABLET | Refills: 2 | Status: SHIPPED | OUTPATIENT
Start: 2024-02-04

## 2024-02-04 NOTE — TELEPHONE ENCOUNTER
Refill Routing Note   Medication(s) are not appropriate for processing by [facility] for the following reason(s):        Required vitals abnormal    [facility] action(s):  Defer               Appointments  past 12m or future 3m with PCP    Date Provider   Last Visit   9/5/2023 Misty Clark MD   Next Visit   Visit date not found Misty Clark MD   ED visits in past 90 days: 0        Note composed:7:23 PM 02/03/2024

## 2024-02-07 ENCOUNTER — CLINICAL SUPPORT (OUTPATIENT)
Dept: REHABILITATION | Facility: HOSPITAL | Age: 64
End: 2024-02-07
Payer: COMMERCIAL

## 2024-02-07 DIAGNOSIS — I89.0 LYMPHEDEMA: Primary | ICD-10-CM

## 2024-02-07 PROCEDURE — 97140 MANUAL THERAPY 1/> REGIONS: CPT

## 2024-02-07 PROCEDURE — 97535 SELF CARE MNGMENT TRAINING: CPT

## 2024-02-07 NOTE — PROGRESS NOTES
[facility]  Occupational Therapy Progress Note  Lymphedema    Date: 2/7/2024  Name: Kurtis Gee  Clinic Number: [id number]    Therapy Diagnosis:   Encounter Diagnosis   Name Primary?    Lymphedema Yes       Physician: Misty Clark MD    Physician Orders: Eval and Treat  Medical Diagnosis:   I87.2 (ICD-10-CM) - Venous insufficiency (chronic) (peripheral)   I89.0 (ICD-10-CM) - Lymphedema, not elsewhere classified      Evaluation Date: 12/5/2023  Insurance Authorization Period Expiration: 12/31/2024  Plan of Care Certification Period: 2/27/2024  Visit # / Visits authorized: 4 / 20  FOTO: see media, 3 / 3    Time In: 4:35  Time Out: 5:30  Total Billable Time: 55 minutes     SUBJECTIVE     Pt reports: foot/ankle garments arrived. Edema improving.   KURTIS reported wearing compression outside of therapy visits: Yes - knee high 20-30 mm hg stockings  She was compliant with home exercise program given last session.   Response to previous treatment: increased wrinkling, decreased girth observed   Functional change: none    Pain: 0/10        OBJECTIVE     Pt arrived wearing knee high compression stockings, in NAD.    Compression garment status: owns knee high 20-30 mm hg and velcro wraps- lower legs and foot/ankle  Compression Rx status: not requested  Pneumatic pump status: does not own    LE Girth Measurements: taken in supine   LANDMARK RIGHT LE  12/5/23 LEFT LE  12/5/23 RIGHT LE  1/22/24 LEFT LE  1/22/24   SBP - - - -   10 below SBP 42.5 cm 44.5 cm 41.0 cm 44.0 cm   20 below SBP 42.0 cm 45.0 cm 42.0 cm 43.0 cm   30 below SBP 31.5 cm 37.0 cm 32.5 cm 36.5 cm   35 below SBP 28.0 cm 31.0 cm 28.5 cm 30.0 cm   Ankle 33.0 cm 36.5 cm 32.0 cm 34.0 cm   Forefoot 25.5 cm 27.0 cm 24.5 cm 24.5 cm         Treatment     KURTIS received the treatments listed below:     Manual therapy techniques were completed for 45 minutes, including:    MANUAL LYMPHATIC DRAINAGE (MLD):    While supine with  LEs elevated, deep abdominal breathing techniques, stimulation along B inguinal regions, drainage of entire BLE tim lower leg, ankle, and foot with return proximally,  Use of Aquaphor due to dryness.      Self-care/home management techniques were completed for 10 minutes including:    Practiced donning/doffing Velcro garments- pt completed independently     Patient Education and Home Exercises      Education provided:   - Progress towards goals     Written Home Exercises Provided: Patient instructed to cont prior HEP.  Exercises were reviewed and KURTIS was able to demonstrate them prior to the end of the session.  KURTIS demonstrated good  understanding of the HEP provided. See EMR under Patient Instructions for exercises provided during therapy sessions.       Assessment     Foot/ankle wraps fit great. Pt to wear daily over compression stockings until next session.     Pt would continue to benefit from skilled OT.      KURTIS is progressing well towards her goals and there are no updates to goals at this time. Pt prognosis is Good.     Pt will continue to benefit from skilled outpatient occupational therapy to address the deficits listed in the problem list on initial evaluation provide pt/family education and to maximize pt's level of independence in the home and community environment.     Pt's spiritual, cultural and educational needs considered and pt agreeable to plan of care and goals.    Anticipated barriers to occupational therapy: none    Goals:  Short Term Goals: (4 weeks)  Goals: Progressing / MET   1. Patient will demonstrate 100% knowledge of lymphedema precautions and signs of infection to allow for reduced lymphedema risk, infection risk, and/or exacerbation of condition.  NOT MET   2. Patient will tolerate daily activities wearing multilayered bandaging to allow for lymphatic drainage support, skin elasticity, and reduction in shape and size of limb.  NOT MET   3. Patient and/or caregiver will  order/obtain appropriate compression garments to maintain lymphatic and venous support.  NOT MET   4. Patient will show decreased total girth measurement in BLE by up to 2 cm to allow for lower extremity symmetry, shoe and clothing choice, and ability to apply needed compression. NOT MET      Long Term Goals: (8 weeks)  Goals: Progressing / MET   1. Patient and/or caregiver to yari/doff compression garment independently and with daily compliance to assist in lymphedema management, skin elasticity, and tissue density NOT MET   2. Patient and/or caregiver will be independent in use of pneumatic compression pump or self manual lymphatic drainage techniques to areas within reach to enhance lymphatic drainage and skin condition.  NOT MET   3. Patient to be independent and compliant with home exercise program to allow for increased function in affected limb. NOT MET   4. Patient will show decreased total girth measurement in BLE by up to 4 cm  to allow for lower extremity symmetry, shoe and clothing choice, and ability to apply needed compression daily. NOT MET             PLAN     Updates/Grading for next session: none    Erna Borrego, OT, CLWT

## 2024-02-19 ENCOUNTER — CLINICAL SUPPORT (OUTPATIENT)
Dept: REHABILITATION | Facility: HOSPITAL | Age: 64
End: 2024-02-19
Payer: COMMERCIAL

## 2024-02-19 DIAGNOSIS — I89.0 LYMPHEDEMA: Primary | ICD-10-CM

## 2024-02-19 PROCEDURE — 29581 APPL MULTLAYER CMPRN SYS LEG: CPT

## 2024-02-19 PROCEDURE — 97140 MANUAL THERAPY 1/> REGIONS: CPT

## 2024-02-19 NOTE — PROGRESS NOTES
[facility]  Occupational Therapy Treatment Note  Lymphedema    Date: 2/19/2024  Name: Kurtis Gee  Clinic Number: [id number]    Therapy Diagnosis:   Encounter Diagnosis   Name Primary?    Lymphedema Yes       Physician: Misty Clark MD    Physician Orders: Eval and Treat  Medical Diagnosis:   I87.2 (ICD-10-CM) - Venous insufficiency (chronic) (peripheral)   I89.0 (ICD-10-CM) - Lymphedema, not elsewhere classified      Evaluation Date: 12/5/2023  Insurance Authorization Period Expiration: 12/31/2024  Plan of Care Certification Period: 2/27/2024  Visit # / Visits authorized: 5 / 20  FOTO: see media, 3 / 3    Time In: 1:45  Time Out: 2:25  Total Billable Time: 40 minutes (late arrival)    SUBJECTIVE     Pt reports: she has been wearing foot/ankle garment over 20-30 mm hg knee highs almost daily.   KURTIS reported wearing compression outside of therapy visits: Yes - knee high 20-30 mm hg stockings with foot/ankle Velcro wraps over.   She was compliant with home exercise program given last session.   Response to previous treatment: increased wrinkling, decreased girth observed   Functional change: none    Pain: 0/10        OBJECTIVE     Pt arrived wearing knee high compression stockings, in NAD.    Compression garment status: owns knee high 20-30 mm hg and velcro wraps- lower legs and foot/ankle  Compression Rx status: not requested  Pneumatic pump status: does not own    LE Girth Measurements: taken in supine   LANDMARK RIGHT LE  12/5/23 LEFT LE  12/5/23 RIGHT LE  1/22/24 LEFT LE  1/22/24 RIGHT LE  2/19/24 LEFT LE  2/19/24   SBP - - - - - -   10 below SBP 42.5 cm 44.5 cm 41.0 cm 44.0 cm 44.5 cm 45.0 cm   20 below SBP 42.0 cm 45.0 cm 42.0 cm 43.0 cm 40.0 cm 40.5 cm   30 below SBP 31.5 cm 37.0 cm 32.5 cm 36.5 cm 31.0 cm 33.0 cm   35 below SBP 28.0 cm 31.0 cm 28.5 cm 30.0 cm 26.5 cm 28.0 cm   Ankle 33.0 cm 36.5 cm 32.0 cm 34.0 cm 32.5 cm 34.5 cm   Forefoot 25.5 cm 27.0 cm 24.5 cm  24.5 cm 24.0 cm 25.5 cm          12/5/23 --> 2/19/24    Treatment     KURTIS received the treatments listed below:     Manual therapy techniques were completed for 32 minutes, including:    MANUAL LYMPHATIC DRAINAGE (MLD):    While supine with LEs elevated, deep abdominal breathing techniques, stimulation along B inguinal regions, drainage of entire BLE tim lower leg, ankle, and foot with return proximally,  Use of Aquaphor due to dryness.      Bandaging was completed for 8 minutes, including:     MULTILAYERED BANDAGING:    Issued supplies and bandaged BLE   Use of Cavilon moisturizer for dry skin  Cotton stockinet: size 9  Cellona padding from dorsum of foot to knee,   2-6cm 2-8cm and 2-10cm Rosidal K rolls foot to distal knee     Pt to leave intact 48 hrs as tolerated, discontinue with any problems, return rolled bandages next session. Wash and wear schedules confirmed.         Patient Education and Home Exercises      Education provided:   - Progress towards goals     Written Home Exercises Provided: Patient instructed to cont prior HEP.  Exercises were reviewed and KURTIS was able to demonstrate them prior to the end of the session.  KURTIS demonstrated good  understanding of the HEP provided. See EMR under Patient Instructions for exercises provided during therapy sessions.       Assessment     Foot/ankle wraps improving edema. Plan to continue therapy until measurements are no longer reducing.     Pt would continue to benefit from skilled OT.      KURTIS is progressing well towards her goals and there are no updates to goals at this time. Pt prognosis is Good.     Pt will continue to benefit from skilled outpatient occupational therapy to address the deficits listed in the problem list on initial evaluation provide pt/family education and to maximize pt's level of independence in the home and community environment.     Pt's spiritual, cultural and educational needs considered and pt agreeable to plan  of care and goals.    Anticipated barriers to occupational therapy: none    Goals:  Short Term Goals: (4 weeks)  Goals: Progressing / MET   1. Patient will demonstrate 100% knowledge of lymphedema precautions and signs of infection to allow for reduced lymphedema risk, infection risk, and/or exacerbation of condition.  NOT MET   2. Patient will tolerate daily activities wearing multilayered bandaging to allow for lymphatic drainage support, skin elasticity, and reduction in shape and size of limb.  NOT MET   3. Patient and/or caregiver will order/obtain appropriate compression garments to maintain lymphatic and venous support.  NOT MET   4. Patient will show decreased total girth measurement in BLE by up to 2 cm to allow for lower extremity symmetry, shoe and clothing choice, and ability to apply needed compression. NOT MET      Long Term Goals: (8 weeks)  Goals: Progressing / MET   1. Patient and/or caregiver to yari/doff compression garment independently and with daily compliance to assist in lymphedema management, skin elasticity, and tissue density NOT MET   2. Patient and/or caregiver will be independent in use of pneumatic compression pump or self manual lymphatic drainage techniques to areas within reach to enhance lymphatic drainage and skin condition.  NOT MET   3. Patient to be independent and compliant with home exercise program to allow for increased function in affected limb. NOT MET   4. Patient will show decreased total girth measurement in BLE by up to 4 cm  to allow for lower extremity symmetry, shoe and clothing choice, and ability to apply needed compression daily. NOT MET             PLAN     Updates/Grading for next session: none    Erna Borrego, OT, CLWT

## 2024-02-21 ENCOUNTER — CLINICAL SUPPORT (OUTPATIENT)
Dept: REHABILITATION | Facility: HOSPITAL | Age: 64
End: 2024-02-21
Payer: COMMERCIAL

## 2024-02-21 DIAGNOSIS — I89.0 LYMPHEDEMA: Primary | ICD-10-CM

## 2024-02-21 PROCEDURE — 29581 APPL MULTLAYER CMPRN SYS LEG: CPT

## 2024-02-21 PROCEDURE — 97140 MANUAL THERAPY 1/> REGIONS: CPT | Mod: 59

## 2024-02-21 NOTE — PROGRESS NOTES
[facility]  Occupational Therapy Treatment Note  Lymphedema    Date: 2/21/2024  Name: Kurtis Gee  Clinic Number: [id number]    Therapy Diagnosis:   Encounter Diagnosis   Name Primary?    Lymphedema Yes         Physician: Misty Clark MD    Physician Orders: Eval and Treat  Medical Diagnosis:   I87.2 (ICD-10-CM) - Venous insufficiency (chronic) (peripheral)   I89.0 (ICD-10-CM) - Lymphedema, not elsewhere classified      Evaluation Date: 12/5/2023  Insurance Authorization Period Expiration: 12/31/2024  Plan of Care Certification Period: 2/27/2024  Visit # / Visits authorized: 5 / 20  FOTO: see media, 3 / 3    Time In: 4:40  Time Out: 5:30  Total Billable Time: 50 minutes (late arrival)    SUBJECTIVE     Pt reports: no concerns.   KURTIS reported wearing compression outside of therapy visits: Yes - knee high 20-30 mm hg stockings with foot/ankle Velcro wraps over.   She was compliant with home exercise program given last session.   Response to previous treatment: increased wrinkling, decreased girth observed   Functional change: none    Pain: 0/10        OBJECTIVE     Pt arrived wearing knee high compression stockings, in NAD.    Compression garment status: owns knee high 20-30 mm hg and velcro wraps- lower legs and foot/ankle  Compression Rx status: not requested  Pneumatic pump status: does not own    LE Girth Measurements: taken in supine   LANDMARK RIGHT LE  12/5/23 LEFT LE  12/5/23 RIGHT LE  1/22/24 LEFT LE  1/22/24 RIGHT LE  2/19/24 LEFT LE  2/19/24   SBP - - - - - -   10 below SBP 42.5 cm 44.5 cm 41.0 cm 44.0 cm 44.5 cm 45.0 cm   20 below SBP 42.0 cm 45.0 cm 42.0 cm 43.0 cm 40.0 cm 40.5 cm   30 below SBP 31.5 cm 37.0 cm 32.5 cm 36.5 cm 31.0 cm 33.0 cm   35 below SBP 28.0 cm 31.0 cm 28.5 cm 30.0 cm 26.5 cm 28.0 cm   Ankle 33.0 cm 36.5 cm 32.0 cm 34.0 cm 32.5 cm 34.5 cm   Forefoot 25.5 cm 27.0 cm 24.5 cm 24.5 cm 24.0 cm 25.5 cm       Treatment     KURTIS received the  treatments listed below:     Manual therapy techniques were completed for 41 minutes, including:    MANUAL LYMPHATIC DRAINAGE (MLD):    While supine with LEs elevated, deep abdominal breathing techniques, stimulation along B inguinal regions, drainage of entire BLE tim lower leg, ankle, and foot with return proximally,  Use of Aquaphor due to dryness.      Bandaging was completed for 9 minutes, including:     MULTILAYERED BANDAGING:    Issued supplies and bandaged BLE   Use of Cavilon moisturizer for dry skin  Cotton stockinet: size 9  Cellona padding from dorsum of foot to knee,   2-6cm 2-8cm and 2-10cm Rosidal K rolls foot to distal knee     Pt to leave intact 48 hrs as tolerated, discontinue with any problems, return rolled bandages next session. Wash and wear schedules confirmed.         Patient Education and Home Exercises      Education provided:   - Progress towards goals     Written Home Exercises Provided: Patient instructed to cont prior HEP.  Exercises were reviewed and KURTIS was able to demonstrate them prior to the end of the session.  KURTIS demonstrated good  understanding of the HEP provided. See EMR under Patient Instructions for exercises provided during therapy sessions.       Assessment     No changes this session. Pt to continue wearing velcro wraps over stockings once bandages are removed. Reinforced short skin breaks to prevent refill.     Pt would continue to benefit from skilled OT.      KURTIS is progressing well towards her goals and there are no updates to goals at this time. Pt prognosis is Good.     Pt will continue to benefit from skilled outpatient occupational therapy to address the deficits listed in the problem list on initial evaluation provide pt/family education and to maximize pt's level of independence in the home and community environment.     Pt's spiritual, cultural and educational needs considered and pt agreeable to plan of care and goals.    Anticipated barriers  to occupational therapy: none    Goals:  Short Term Goals: (4 weeks)  Goals: Progressing / MET   1. Patient will demonstrate 100% knowledge of lymphedema precautions and signs of infection to allow for reduced lymphedema risk, infection risk, and/or exacerbation of condition.  NOT MET   2. Patient will tolerate daily activities wearing multilayered bandaging to allow for lymphatic drainage support, skin elasticity, and reduction in shape and size of limb.  NOT MET   3. Patient and/or caregiver will order/obtain appropriate compression garments to maintain lymphatic and venous support.  NOT MET   4. Patient will show decreased total girth measurement in BLE by up to 2 cm to allow for lower extremity symmetry, shoe and clothing choice, and ability to apply needed compression. NOT MET      Long Term Goals: (8 weeks)  Goals: Progressing / MET   1. Patient and/or caregiver to yari/doff compression garment independently and with daily compliance to assist in lymphedema management, skin elasticity, and tissue density NOT MET   2. Patient and/or caregiver will be independent in use of pneumatic compression pump or self manual lymphatic drainage techniques to areas within reach to enhance lymphatic drainage and skin condition.  NOT MET   3. Patient to be independent and compliant with home exercise program to allow for increased function in affected limb. NOT MET   4. Patient will show decreased total girth measurement in BLE by up to 4 cm  to allow for lower extremity symmetry, shoe and clothing choice, and ability to apply needed compression daily. NOT MET             PLAN     Updates/Grading for next session: none    Erna Borrego, OT, CLWT

## 2024-03-06 ENCOUNTER — CLINICAL SUPPORT (OUTPATIENT)
Dept: REHABILITATION | Facility: HOSPITAL | Age: 64
End: 2024-03-06
Payer: COMMERCIAL

## 2024-03-06 DIAGNOSIS — I89.0 LYMPHEDEMA: Primary | ICD-10-CM

## 2024-03-06 PROCEDURE — 29581 APPL MULTLAYER CMPRN SYS LEG: CPT

## 2024-03-06 PROCEDURE — 97535 SELF CARE MNGMENT TRAINING: CPT

## 2024-03-06 PROCEDURE — 97016 VASOPNEUMATIC DEVICE THERAPY: CPT

## 2024-03-06 NOTE — PROGRESS NOTES
[facility]  Occupational Therapy Treatment Note  Lymphedema    Date: 3/6/2024  Name: Kurtis Gee  Clinic Number: [id number]    Therapy Diagnosis:   Encounter Diagnosis   Name Primary?    Lymphedema Yes       Physician: Misty Clark MD    Physician Orders: Eval and Treat  Medical Diagnosis:   I87.2 (ICD-10-CM) - Venous insufficiency (chronic) (peripheral)   I89.0 (ICD-10-CM) - Lymphedema, not elsewhere classified      Evaluation Date: 12/5/2023  Insurance Authorization Period Expiration: 12/31/2024  Plan of Care Certification Period: 2/27/2024  Visit # / Visits authorized: 7 / 20  FOTO: see media, 3 / 3    Time In: 4:30  Time Out: 5:30  Total Billable Time: 60 minutes     SUBJECTIVE     Pt reports: she has been wearing compression stockings daily, Velcro wrap 3-4 days per week.  KURTIS reported wearing compression outside of therapy visits: Yes - knee high 20-30 mm hg stockings with foot/ankle Velcro wraps over.   She was compliant with home exercise program given last session.   Response to previous treatment: increased wrinkling, decreased girth observed   Functional change: none    Pain: 0/10        OBJECTIVE     Pt arrived wearing knee high compression stockings, Velcro wraps over, in NAD.    Compression garment status: owns knee high 20-30 mm hg and velcro wraps- lower legs and foot/ankle  Compression Rx status: not requested  Pneumatic pump status: does not own    LE Girth Measurements: taken in supine   LANDMARK RIGHT LE  12/5/23 LEFT LE  12/5/23 RIGHT LE  1/22/24 LEFT LE  1/22/24 RIGHT LE  2/19/24 LEFT LE  2/19/24   SBP - - - - - -   10 below SBP 42.5 cm 44.5 cm 41.0 cm 44.0 cm 44.5 cm 45.0 cm   20 below SBP 42.0 cm 45.0 cm 42.0 cm 43.0 cm 40.0 cm 40.5 cm   30 below SBP 31.5 cm 37.0 cm 32.5 cm 36.5 cm 31.0 cm 33.0 cm   35 below SBP 28.0 cm 31.0 cm 28.5 cm 30.0 cm 26.5 cm 28.0 cm   Ankle 33.0 cm 36.5 cm 32.0 cm 34.0 cm 32.5 cm 34.5 cm   Forefoot 25.5 cm 27.0 cm 24.5 cm  24.5 cm 24.0 cm 25.5 cm     LANDMARK RIGHT LE  3/6/24 LEFT LE  3/6/24   SBP - -   10 below SBP 41.5 cm 42.5 cm   20 below SBP 43.0 cm 42.0 cm   30 below SBP 37.0 cm 36.0 cm   35 below SBP 28.0 cm 28.5 cm   Ankle 29.5 cm 32.0 cm   Forefoot 24.5 cm 25.0 cm       Treatment     KURTIS received the treatments listed below:     SEQUENTIAL COMPRESSION PUMP: full leg sleeve applied to BLE  VES 5200 with default setting with distal pressures starting at 45mmHg entire LE simultaneous to education     Self-care/home management techniques were completed for 15 minutes, including:    Benefits of advanced vs basic home compression pump.  Expected outcomes if Velcro wraps are worn daily.    Bandaging was completed for 9 minutes, including:     MULTILAYERED BANDAGING:    Issued supplies and bandaged BLE   Use of Cavilon moisturizer for dry skin  Cotton stockinet: size 9  Cellona padding from dorsum of foot to knee,   2-6cm 2-8cm and 2-10cm Rosidal K rolls foot to distal knee     Pt to leave intact 48 hrs as tolerated, discontinue with any problems, return rolled bandages next session. Wash and wear schedules confirmed.         Patient Education and Home Exercises      Education provided:   - Progress towards goals     Written Home Exercises Provided: Patient instructed to cont prior HEP.  Exercises were reviewed and KURTIS was able to demonstrate them prior to the end of the session.  KURTIS demonstrated good  understanding of the HEP provided. See EMR under Patient Instructions for exercises provided during therapy sessions.       Assessment     BLE continue to reduce per updated measurements. Pump referral sent to [facility].     Pt has been compliant with compression, elevation, and exercise for > 4 weeks but hyperplasia persists. A home pump is recommended.     Pt would continue to benefit from skilled OT.      KURTIS is progressing well towards her goals and there are no updates to goals at this time. Pt prognosis is Good.      Pt will continue to benefit from skilled outpatient occupational therapy to address the deficits listed in the problem list on initial evaluation provide pt/family education and to maximize pt's level of independence in the home and community environment.     Pt's spiritual, cultural and educational needs considered and pt agreeable to plan of care and goals.    Anticipated barriers to occupational therapy: none    Goals:  Short Term Goals: (4 weeks)  Goals: Progressing / MET   1. Patient will demonstrate 100% knowledge of lymphedema precautions and signs of infection to allow for reduced lymphedema risk, infection risk, and/or exacerbation of condition.  NOT MET   2. Patient will tolerate daily activities wearing multilayered bandaging to allow for lymphatic drainage support, skin elasticity, and reduction in shape and size of limb.  NOT MET   3. Patient and/or caregiver will order/obtain appropriate compression garments to maintain lymphatic and venous support.  NOT MET   4. Patient will show decreased total girth measurement in BLE by up to 2 cm to allow for lower extremity symmetry, shoe and clothing choice, and ability to apply needed compression. NOT MET      Long Term Goals: (8 weeks)  Goals: Progressing / MET   1. Patient and/or caregiver to yari/doff compression garment independently and with daily compliance to assist in lymphedema management, skin elasticity, and tissue density NOT MET   2. Patient and/or caregiver will be independent in use of pneumatic compression pump or self manual lymphatic drainage techniques to areas within reach to enhance lymphatic drainage and skin condition.  NOT MET   3. Patient to be independent and compliant with home exercise program to allow for increased function in affected limb. NOT MET   4. Patient will show decreased total girth measurement in BLE by up to 4 cm  to allow for lower extremity symmetry, shoe and clothing choice, and ability to apply needed  compression daily. NOT MET             PLAN     Updates/Grading for next session: none    Erna Borrego, OT, CLWT

## 2024-03-12 ENCOUNTER — CLINICAL SUPPORT (OUTPATIENT)
Dept: REHABILITATION | Facility: HOSPITAL | Age: 64
End: 2024-03-12
Payer: COMMERCIAL

## 2024-03-12 DIAGNOSIS — I89.0 LYMPHEDEMA: Primary | ICD-10-CM

## 2024-03-12 PROCEDURE — 97016 VASOPNEUMATIC DEVICE THERAPY: CPT

## 2024-03-12 PROCEDURE — 29581 APPL MULTLAYER CMPRN SYS LEG: CPT

## 2024-03-12 NOTE — PROGRESS NOTES
[facility]  Occupational Therapy Treatment Note  Lymphedema    Date: 3/12/2024  Name: Kurtis Gee  Clinic Number: [id number]    Therapy Diagnosis:   Encounter Diagnosis   Name Primary?    Lymphedema Yes       Physician: Misty Clark MD    Physician Orders: Eval and Treat  Medical Diagnosis:   I87.2 (ICD-10-CM) - Venous insufficiency (chronic) (peripheral)   I89.0 (ICD-10-CM) - Lymphedema, not elsewhere classified      Evaluation Date: 12/5/2023  Insurance Authorization Period Expiration: 12/31/2024  Plan of Care Certification Period: 2/27/2024  Visit # / Visits authorized: 8 / 20  FOTO: see media, 3 / 3    Time In: 4:05  Time Out: 5:00  Total Billable Time: 55 minutes     SUBJECTIVE     Pt reports: no concerns. Agreeable to discharge next week.  KURTIS reported wearing compression outside of therapy visits: Yes - knee high 20-30 mm hg stockings with foot/ankle Velcro wraps over.   She was compliant with home exercise program given last session.   Response to previous treatment: increased wrinkling, decreased girth observed   Functional change: none    Pain: 0/10        OBJECTIVE     Pt arrived wearing knee high compression stockings, Velcro wraps over, in NAD.    Compression garment status: owns knee high 20-30 mm hg and velcro wraps- lower legs and foot/ankle  Compression Rx status: not requested  Pneumatic pump status: does not own    LE Girth Measurements: taken in supine   LANDMARK RIGHT LE  12/5/23 LEFT LE  12/5/23 RIGHT LE  1/22/24 LEFT LE  1/22/24 RIGHT LE  2/19/24 LEFT LE  2/19/24   SBP - - - - - -   10 below SBP 42.5 cm 44.5 cm 41.0 cm 44.0 cm 44.5 cm 45.0 cm   20 below SBP 42.0 cm 45.0 cm 42.0 cm 43.0 cm 40.0 cm 40.5 cm   30 below SBP 31.5 cm 37.0 cm 32.5 cm 36.5 cm 31.0 cm 33.0 cm   35 below SBP 28.0 cm 31.0 cm 28.5 cm 30.0 cm 26.5 cm 28.0 cm   Ankle 33.0 cm 36.5 cm 32.0 cm 34.0 cm 32.5 cm 34.5 cm   Forefoot 25.5 cm 27.0 cm 24.5 cm 24.5 cm 24.0 cm 25.5 cm      LANDMARK RIGHT LE  3/6/24 LEFT LE  3/6/24   SBP - -   10 below SBP 41.5 cm 42.5 cm   20 below SBP 43.0 cm 42.0 cm   30 below SBP 37.0 cm 36.0 cm   35 below SBP 28.0 cm 28.5 cm   Ankle 29.5 cm 32.0 cm   Forefoot 24.5 cm 25.0 cm       Treatment     KURTIS received the treatments listed below:     SEQUENTIAL COMPRESSION PUMP: full leg sleeve applied to BLE  VES 5200 with default setting with distal pressures starting at 45mmHg entire LE simultaneous to education       Bandaging was completed for 9 minutes, including:     MULTILAYERED BANDAGING:    Issued supplies and bandaged BLE   Use of Cavilon moisturizer for dry skin  Cotton stockinet: size 9  Cellona padding from dorsum of foot to knee,   2-6cm 2-8cm and 2-10cm Rosidal K rolls foot to distal knee     Pt to leave intact 48 hrs as tolerated, discontinue with any problems, return rolled bandages next session. Wash and wear schedules confirmed.     Educated pt on benefits of bandaging to maximize edema reductions before discharge.     Patient Education and Home Exercises      Education provided:   - Progress towards goals     Written Home Exercises Provided: Patient instructed to cont prior HEP.  Exercises were reviewed and KURTIS was able to demonstrate them prior to the end of the session.  KURTIS demonstrated good  understanding of the HEP provided. See EMR under Patient Instructions for exercises provided during therapy sessions.       Assessment     No significant changes. Discharge next week.     Pt would continue to benefit from skilled OT.      KURTIS is progressing well towards her goals and there are no updates to goals at this time. Pt prognosis is Good.     Pt will continue to benefit from skilled outpatient occupational therapy to address the deficits listed in the problem list on initial evaluation provide pt/family education and to maximize pt's level of independence in the home and community environment.     Pt's spiritual, cultural and  educational needs considered and pt agreeable to plan of care and goals.    Anticipated barriers to occupational therapy: none    Goals:  Short Term Goals: (4 weeks)  Goals: Progressing / MET   1. Patient will demonstrate 100% knowledge of lymphedema precautions and signs of infection to allow for reduced lymphedema risk, infection risk, and/or exacerbation of condition.  NOT MET   2. Patient will tolerate daily activities wearing multilayered bandaging to allow for lymphatic drainage support, skin elasticity, and reduction in shape and size of limb.  NOT MET   3. Patient and/or caregiver will order/obtain appropriate compression garments to maintain lymphatic and venous support.  NOT MET   4. Patient will show decreased total girth measurement in BLE by up to 2 cm to allow for lower extremity symmetry, shoe and clothing choice, and ability to apply needed compression. NOT MET      Long Term Goals: (8 weeks)  Goals: Progressing / MET   1. Patient and/or caregiver to yari/doff compression garment independently and with daily compliance to assist in lymphedema management, skin elasticity, and tissue density NOT MET   2. Patient and/or caregiver will be independent in use of pneumatic compression pump or self manual lymphatic drainage techniques to areas within reach to enhance lymphatic drainage and skin condition.  NOT MET   3. Patient to be independent and compliant with home exercise program to allow for increased function in affected limb. NOT MET   4. Patient will show decreased total girth measurement in BLE by up to 4 cm  to allow for lower extremity symmetry, shoe and clothing choice, and ability to apply needed compression daily. NOT MET             PLAN     Updates/Grading for next session: none    Erna Borrego, OT, CLWT

## 2024-03-18 ENCOUNTER — CLINICAL SUPPORT (OUTPATIENT)
Dept: REHABILITATION | Facility: HOSPITAL | Age: 64
End: 2024-03-18
Payer: COMMERCIAL

## 2024-03-18 DIAGNOSIS — I89.0 LYMPHEDEMA: Primary | ICD-10-CM

## 2024-03-18 PROCEDURE — 97535 SELF CARE MNGMENT TRAINING: CPT | Mod: 59

## 2024-03-18 PROCEDURE — 97016 VASOPNEUMATIC DEVICE THERAPY: CPT

## 2024-03-18 NOTE — PROGRESS NOTES
[facility]  Occupational Therapy Discharge Note  Lymphedema    Date: 3/18/2024  Name: Kurtis Gee  Clinic Number: [id number]    Therapy Diagnosis:   Encounter Diagnosis   Name Primary?    Lymphedema Yes       Physician: Misty Clark MD    Physician Orders: Eval and Treat  Medical Diagnosis:   I87.2 (ICD-10-CM) - Venous insufficiency (chronic) (peripheral)   I89.0 (ICD-10-CM) - Lymphedema, not elsewhere classified      Evaluation Date: 12/5/2023  Insurance Authorization Period Expiration: 12/31/2024  Plan of Care Certification Period: 2/27/2024  Visit # / Visits authorized: 9 / 20  FOTO: see media, 3 / 3    Time In: 3:50  Time Out: 4:30  Total Billable Time: 40 minutes     SUBJECTIVE     Pt reports: pleased with progress made during therapy. Ready for discharge.   KURTIS reported wearing compression outside of therapy visits: Yes - knee high 20-30 mm hg stockings with foot/ankle Velcro wraps over.   She was compliant with home exercise program given last session.   Response to previous treatment: increased wrinkling, decreased girth observed   Functional change: none    Pain: 0/10        OBJECTIVE     Pt arrived wearing knee high compression stockings, Velcro wraps over, in NAD.    Compression garment status: owns knee high 20-30 mm hg and velcro wraps- lower legs and foot/ankle  Compression Rx status: not requested  Pneumatic pump status: does not own    LE Girth Measurements: taken in supine   LANDMARK RIGHT LE  12/5/23 LEFT LE  12/5/23 RIGHT LE  1/22/24 LEFT LE  1/22/24 RIGHT LE  2/19/24 LEFT LE  2/19/24   SBP - - - - - -   10 below SBP 42.5 cm 44.5 cm 41.0 cm 44.0 cm 44.5 cm 45.0 cm   20 below SBP 42.0 cm 45.0 cm 42.0 cm 43.0 cm 40.0 cm 40.5 cm   30 below SBP 31.5 cm 37.0 cm 32.5 cm 36.5 cm 31.0 cm 33.0 cm   35 below SBP 28.0 cm 31.0 cm 28.5 cm 30.0 cm 26.5 cm 28.0 cm   Ankle 33.0 cm 36.5 cm 32.0 cm 34.0 cm 32.5 cm 34.5 cm   Forefoot 25.5 cm 27.0 cm 24.5 cm 24.5 cm 24.0  cm 25.5 cm     LANDMARK RIGHT LE  3/6/24 LEFT LE  3/6/24   SBP - -   10 below SBP 41.5 cm 42.5 cm   20 below SBP 43.0 cm 42.0 cm   30 below SBP 37.0 cm 36.0 cm   35 below SBP 28.0 cm 28.5 cm   Ankle 29.5 cm 32.0 cm   Forefoot 24.5 cm 25.0 cm       Treatment     KURTIS received the treatments listed below:     Self-care/home management techniques were completed for 15 minutes, including:    Educated pt on:  -set-up/use of [facility] pump  -home management plan- wear compression stockings with Velcro wraps over daily, use pump daily.  -seek new referral if edema status worsens/changes in the future    SEQUENTIAL COMPRESSION PUMP: full leg sleeve applied to BLE  VES 5200 with default setting with distal pressures starting at 45mmHg entire LE simultaneous to education         Patient Education and Home Exercises      Education provided:   - Progress towards goals     Written Home Exercises Provided: Patient instructed to cont prior HEP.  Exercises were reviewed and KURTIS was able to demonstrate them prior to the end of the session.  KURTIS demonstrated good  understanding of the HEP provided. See EMR under Patient Instructions for exercises provided during therapy sessions.       Assessment     Pt has participated in complete decongestive therapy x 12 weeks with good progress. Girth measurements have reached a plateau in recent weeks. She is ready for discharge to home management. All question and concerns addressed.     Anticipated barriers to occupational therapy: none    Goals:  Short Term Goals: (4 weeks)  Goals: Progressing / MET   1. Patient will demonstrate 100% knowledge of lymphedema precautions and signs of infection to allow for reduced lymphedema risk, infection risk, and/or exacerbation of condition.  MET   2. Patient will tolerate daily activities wearing multilayered bandaging to allow for lymphatic drainage support, skin elasticity, and reduction in shape and size of limb.  MET   3. Patient and/or  caregiver will order/obtain appropriate compression garments to maintain lymphatic and venous support.  MET   4. Patient will show decreased total girth measurement in BLE by up to 2 cm to allow for lower extremity symmetry, shoe and clothing choice, and ability to apply needed compression. MET      Long Term Goals: (8 weeks)  Goals: Progressing / MET   1. Patient and/or caregiver to yari/doff compression garment independently and with daily compliance to assist in lymphedema management, skin elasticity, and tissue density MET   2. Patient and/or caregiver will be independent in use of pneumatic compression pump or self manual lymphatic drainage techniques to areas within reach to enhance lymphatic drainage and skin condition.  MET   3. Patient to be independent and compliant with home exercise program to allow for increased function in affected limb. MET   4. Patient will show decreased total girth measurement in BLE by up to 4 cm  to allow for lower extremity symmetry, shoe and clothing choice, and ability to apply needed compression daily. MET             PLAN     Discharge.     Erna Borrego, OT, CLWT

## 2024-04-03 ENCOUNTER — TELEPHONE (OUTPATIENT)
Dept: INTERNAL MEDICINE | Facility: CLINIC | Age: 64
End: 2024-04-03
Payer: COMMERCIAL

## 2024-04-03 NOTE — TELEPHONE ENCOUNTER
Called and spoke to pt and advised her of this [facility] facility   They do compression care   They wanted to have the 6 months of back notes advised pt of this   We will hold off on this until pt finds more info

## 2024-04-03 NOTE — TELEPHONE ENCOUNTER
----- Message from Rohini Blanc sent at 4/3/2024 10:19 AM CDT -----  Contact: [facility] @ [phone] [place] [address] [facility]  1[facility]ICE     Patient is calling for Medical Advice regarding:Office is calling for the 3rd time. Need 6 month please of clinical notes     How long has patient had these symptoms:    Pharmacy name and phone#:    Would like response via [employer]: call     Comments:

## 2024-06-10 ENCOUNTER — PATIENT MESSAGE (OUTPATIENT)
Dept: INTERNAL MEDICINE | Facility: CLINIC | Age: 64
End: 2024-06-10
Payer: COMMERCIAL

## 2024-07-18 RX ORDER — DOXYCYCLINE 100 MG/1
100 CAPSULE ORAL 2 TIMES DAILY
Qty: 20 CAPSULE | Refills: 2 | Status: SHIPPED | OUTPATIENT
Start: 2024-07-18

## 2024-08-01 ENCOUNTER — TELEPHONE (OUTPATIENT)
Dept: INTERNAL MEDICINE | Facility: CLINIC | Age: 64
End: 2024-08-01
Payer: COMMERCIAL

## 2024-08-01 NOTE — TELEPHONE ENCOUNTER
Pt needs an new rx for compression stockings. Please include lymphedema as the diagnosis. Pt would also like for it to states that a new pair is needed every 6 months or 2 pair per year. Please fax to [facility].

## 2024-08-20 ENCOUNTER — HOSPITAL ENCOUNTER (OUTPATIENT)
Dept: RADIOLOGY | Facility: HOSPITAL | Age: 64
Discharge: HOME | End: 2024-08-20
Attending: INTERNAL MEDICINE
Payer: COMMERCIAL

## 2024-08-20 DIAGNOSIS — Z12.31 SCREENING MAMMOGRAM FOR BREAST CANCER: ICD-10-CM

## 2024-08-20 PROCEDURE — 77067 SCR MAMMO BI INCL CAD: CPT | Mod: TC

## 2024-08-31 ENCOUNTER — LAB VISIT (OUTPATIENT)
Dept: LAB | Facility: HOSPITAL | Age: 64
End: 2024-08-31
Attending: INTERNAL MEDICINE
Payer: COMMERCIAL

## 2024-08-31 DIAGNOSIS — R79.9 ABNORMAL BLOOD CHEMISTRY: ICD-10-CM

## 2024-08-31 DIAGNOSIS — E53.8 VITAMIN B12 DEFICIENCY: ICD-10-CM

## 2024-08-31 DIAGNOSIS — E55.9 VITAMIN D DEFICIENCY DISEASE: ICD-10-CM

## 2024-08-31 DIAGNOSIS — I10 PRIMARY HYPERTENSION: ICD-10-CM

## 2024-08-31 LAB
25(OH)D3+25(OH)D2 SERPL-MCNC: 35 NG/ML (ref 30–96)
ALBUMIN SERPL BCP-MCNC: 3.6 G/DL (ref 3.5–5.2)
ALP SERPL-CCNC: 86 U/L (ref 55–135)
ALT SERPL W/O P-5'-P-CCNC: 28 U/L (ref 10–44)
ANION GAP SERPL CALC-SCNC: 12 MMOL/L (ref 8–16)
AST SERPL-CCNC: 29 U/L (ref 10–40)
BASOPHILS # BLD AUTO: 0.03 K/UL (ref 0–0.2)
BASOPHILS NFR BLD: 0.5 % (ref 0–1.9)
BILIRUB SERPL-MCNC: 0.6 MG/DL (ref 0.1–1)
BUN SERPL-MCNC: 14 MG/DL (ref 8–23)
CALCIUM SERPL-MCNC: 9.2 MG/DL (ref 8.7–10.5)
CHLORIDE SERPL-SCNC: 107 MMOL/L (ref 95–110)
CHOLEST SERPL-MCNC: 171 MG/DL (ref 120–199)
CHOLEST/HDLC SERPL: 4.3 {RATIO} (ref 2–5)
CO2 SERPL-SCNC: 24 MMOL/L (ref 23–29)
CREAT SERPL-MCNC: 0.9 MG/DL (ref 0.5–1.4)
DIFFERENTIAL METHOD BLD: ABNORMAL
EOSINOPHIL # BLD AUTO: 0.1 K/UL (ref 0–0.5)
EOSINOPHIL NFR BLD: 1.7 % (ref 0–8)
ERYTHROCYTE [DISTWIDTH] IN BLOOD BY AUTOMATED COUNT: 13.2 % (ref 11.5–14.5)
EST. GFR  (NO RACE VARIABLE): >60 ML/MIN/1.73 M^2
ESTIMATED AVG GLUCOSE: 143 MG/DL (ref 68–131)
GLUCOSE SERPL-MCNC: 144 MG/DL (ref 70–110)
HBA1C MFR BLD: 6.6 % (ref 4–5.6)
HCT VFR BLD AUTO: 38.2 % (ref 37–48.5)
HDLC SERPL-MCNC: 40 MG/DL (ref 40–75)
HDLC SERPL: 23.4 % (ref 20–50)
HGB BLD-MCNC: 13 G/DL (ref 12–16)
IMM GRANULOCYTES # BLD AUTO: 0.01 K/UL (ref 0–0.04)
IMM GRANULOCYTES NFR BLD AUTO: 0.2 % (ref 0–0.5)
LDLC SERPL CALC-MCNC: 116.6 MG/DL (ref 63–159)
LYMPHOCYTES # BLD AUTO: 3.6 K/UL (ref 1–4.8)
LYMPHOCYTES NFR BLD: 56.7 % (ref 18–48)
MCH RBC QN AUTO: 32.3 PG (ref 27–31)
MCHC RBC AUTO-ENTMCNC: 34 G/DL (ref 32–36)
MCV RBC AUTO: 95 FL (ref 82–98)
MONOCYTES # BLD AUTO: 0.5 K/UL (ref 0.3–1)
MONOCYTES NFR BLD: 8.3 % (ref 4–15)
NEUTROPHILS # BLD AUTO: 2.1 K/UL (ref 1.8–7.7)
NEUTROPHILS NFR BLD: 32.6 % (ref 38–73)
NONHDLC SERPL-MCNC: 131 MG/DL
NRBC BLD-RTO: 0 /100 WBC
PLATELET # BLD AUTO: 142 K/UL (ref 150–450)
PMV BLD AUTO: 11.5 FL (ref 9.2–12.9)
POTASSIUM SERPL-SCNC: 4.7 MMOL/L (ref 3.5–5.1)
PROT SERPL-MCNC: 6.8 G/DL (ref 6–8.4)
RBC # BLD AUTO: 4.03 M/UL (ref 4–5.4)
SODIUM SERPL-SCNC: 143 MMOL/L (ref 136–145)
TRIGL SERPL-MCNC: 72 MG/DL (ref 30–150)
TSH SERPL DL<=0.005 MIU/L-ACNC: 0.95 UIU/ML (ref 0.4–4)
VIT B12 SERPL-MCNC: 655 PG/ML (ref 210–950)
WBC # BLD AUTO: 6.38 K/UL (ref 3.9–12.7)

## 2024-08-31 PROCEDURE — 83036 HEMOGLOBIN GLYCOSYLATED A1C: CPT | Performed by: INTERNAL MEDICINE

## 2024-08-31 PROCEDURE — 85025 COMPLETE CBC W/AUTO DIFF WBC: CPT | Performed by: INTERNAL MEDICINE

## 2024-08-31 PROCEDURE — 82306 VITAMIN D 25 HYDROXY: CPT | Performed by: INTERNAL MEDICINE

## 2024-08-31 PROCEDURE — 80053 COMPREHEN METABOLIC PANEL: CPT | Performed by: INTERNAL MEDICINE

## 2024-08-31 PROCEDURE — 82607 VITAMIN B-12: CPT | Performed by: INTERNAL MEDICINE

## 2024-08-31 PROCEDURE — 80061 LIPID PANEL: CPT | Performed by: INTERNAL MEDICINE

## 2024-08-31 PROCEDURE — 84443 ASSAY THYROID STIM HORMONE: CPT | Performed by: INTERNAL MEDICINE

## 2024-09-06 ENCOUNTER — TELEPHONE (OUTPATIENT)
Dept: PHARMACY | Facility: CLINIC | Age: 64
End: 2024-09-06
Payer: COMMERCIAL

## 2024-09-06 ENCOUNTER — LAB VISIT (OUTPATIENT)
Dept: LAB | Facility: HOSPITAL | Age: 64
End: 2024-09-06
Attending: INTERNAL MEDICINE
Payer: COMMERCIAL

## 2024-09-06 ENCOUNTER — OFFICE VISIT (OUTPATIENT)
Dept: INTERNAL MEDICINE | Facility: CLINIC | Age: 64
End: 2024-09-06
Payer: COMMERCIAL

## 2024-09-06 VITALS
SYSTOLIC BLOOD PRESSURE: 136 MMHG | BODY MASS INDEX: 42.52 KG/M2 | WEIGHT: 264.56 LBS | OXYGEN SATURATION: 98 % | HEART RATE: 64 BPM | HEIGHT: 66 IN | DIASTOLIC BLOOD PRESSURE: 70 MMHG

## 2024-09-06 DIAGNOSIS — Z23 NEED FOR VACCINATION: ICD-10-CM

## 2024-09-06 DIAGNOSIS — Z79.4 TYPE 2 DIABETES MELLITUS WITHOUT COMPLICATION, WITH LONG-TERM CURRENT USE OF INSULIN: ICD-10-CM

## 2024-09-06 DIAGNOSIS — L70.9 ACNE, UNSPECIFIED ACNE TYPE: ICD-10-CM

## 2024-09-06 DIAGNOSIS — E11.9 TYPE 2 DIABETES MELLITUS WITHOUT COMPLICATION, WITH LONG-TERM CURRENT USE OF INSULIN: ICD-10-CM

## 2024-09-06 DIAGNOSIS — Z83.2 FAMILY HISTORY OF AUTOIMMUNE DISORDER: ICD-10-CM

## 2024-09-06 DIAGNOSIS — I89.0 LYMPHEDEMA: ICD-10-CM

## 2024-09-06 DIAGNOSIS — E11.9 TYPE 2 DIABETES MELLITUS WITHOUT COMPLICATION, WITHOUT LONG-TERM CURRENT USE OF INSULIN: ICD-10-CM

## 2024-09-06 DIAGNOSIS — Z00.00 ROUTINE GENERAL MEDICAL EXAMINATION AT A HEALTH CARE FACILITY: Primary | ICD-10-CM

## 2024-09-06 DIAGNOSIS — E66.01 MORBID OBESITY: ICD-10-CM

## 2024-09-06 LAB
CCP AB SER IA-ACNC: <0.5 U/ML
RHEUMATOID FACT SERPL-ACNC: <13 IU/ML (ref 0–15)

## 2024-09-06 PROCEDURE — 36415 COLL VENOUS BLD VENIPUNCTURE: CPT | Performed by: INTERNAL MEDICINE

## 2024-09-06 PROCEDURE — 86200 CCP ANTIBODY: CPT | Performed by: INTERNAL MEDICINE

## 2024-09-06 PROCEDURE — 86431 RHEUMATOID FACTOR QUANT: CPT | Performed by: INTERNAL MEDICINE

## 2024-09-06 PROCEDURE — 99999 PR PBB SHADOW E&M-EST. PATIENT-LVL III: CPT | Mod: PBBFAC,,, | Performed by: INTERNAL MEDICINE

## 2024-09-06 PROCEDURE — 86038 ANTINUCLEAR ANTIBODIES: CPT | Performed by: INTERNAL MEDICINE

## 2024-09-06 RX ORDER — TRETINOIN 0.5 MG/G
LOTION TOPICAL
Qty: 20 G | Refills: 3 | Status: SHIPPED | OUTPATIENT
Start: 2024-09-06

## 2024-09-06 RX ORDER — SEMAGLUTIDE 0.68 MG/ML
0.25 INJECTION, SOLUTION SUBCUTANEOUS
Qty: 1 EACH | Refills: 1 | Status: SHIPPED | OUTPATIENT
Start: 2024-09-06

## 2024-09-06 RX ORDER — SPIRONOLACTONE 25 MG/1
TABLET ORAL
Qty: 180 TABLET | Refills: 2 | Status: SHIPPED | OUTPATIENT
Start: 2024-09-06

## 2024-09-06 NOTE — TELEPHONE ENCOUNTER
[facility]/Population Health Chart Review & Patient Outreach Details For Medication Adherence Project    Reason for Outreach Encounter: 3rd Party payor non-compliance report ([employer], [employer], C, etc)    2.  Patient Outreach Method: [employer] message    3.   Medication in question:   Hypertension Medications               spironolactone (ALDACTONE) 25 MG tablet TAKE 1 TO 2 TABLETS BY MOUTH DAILY FOR BLOOD PRESSURE    valsartan-hydrochlorothiazide (DIOVAN-HCT) 320-25 mg per tablet Take 1 tablet by mouth once daily.               LAST FILLED: 5/29/24 for 90 day supply    4.  Reviewed and or Updates Made To: Patient Chart    5. Outreach Outcomes and/or actions taken: Sent inquiry to patient: Waiting for response    Additional Notes:     Valsartan Expiration Date: 09/04/24

## 2024-09-06 NOTE — PROGRESS NOTES
CHIEF COMPLAINT: Annual exam    HISTORY OF PRESENT ILLNESS: This is a 64-year-old woman who presents for her annual exam     She wears her compression stockings daily. She continues to have chronic swelling in legs, left greater than right.  No fever, chills, nausea, vomiting, constipation, diarrhea. She has occasional irritation and redness in the lower legs and she starts on doxycycline right away takes care of the problem. No cellulitis recently.  She did lymphedema therapy last year which helped.  The swelling is worse in the ankles.  She has a wrap for the ankles.      She had a MI/BSO 9/4/13 which was complicated by a partial small bowel obstruction and left subclavian DVT. She completed her 3 months of Xarelto. Ultrasound of left upper extremity was fine 11/2013. NO left arm pain or swelling.       She continues to take valsartan hct 320/25 once daily and spironolactone 25 mg 2 tablets daily for hypertension. NO chest pain or shortness of breath. She is taking vitamin D supplement 5000 units daily for her vitamin D deficiency.      She had an angiolipoma 1/14/2021 from the left breast        Mother and brother have rheumatoid arthritis- her joints feel fine. Occasional knee pain.         PAST MEDICAL HISTORY:   1. Hypertension  2. History of cellulitis of the right lower extremity June 2008.   3. Venous insufficiency   4. Mild vitamin D deficiency.   5. Obesity.    6. Left subclavian DVT 9/2013 - postoperatively    7. Partial small bowel obstruction after MI/BSO 9/4/13    PAST SURGICAL HISTORY:   1. Appendectomy in February 2002 with complication of what sounds like small bowel obstruction.   2. Umbilical hernia repair in 2003.   3. D&C April 2008 secondary to irregular menstrual periods.    4. MI/BSO and small bowel resection with anastamosis 9/2013    SOCIAL HISTORY: She quit smoking in 2002, smoked less than a pack of cigarettes daily for 8 years, drinks alcohol once 1-2 times a month. Single, no  children. She is an . Now  of [facility] [employer].     FAMILY HISTORY: Mother is living with rheumatoid arthritis. Father  of heart problems. She has a sister and three brothers who are healthy. One brother was recently diagnosed with FSGS. Brother  of complications of drugs     REVIEW OF SYSTEMS: She denies fevers, chills, night sweats, fatigue, visual change, hearing loss, sinus congestion, sore throat, chest pain, shortness of breath, nausea, vomiting, constipation, diarrhea, dysuria, hematuria, polydipsia, polyuria, joint pain, muscle pain, headaches, anxiety, depression, insomnia.       PHYSICAL EXAMINATION:            General: Alert, oriented. No apparent distress. Affect within normal limits.   Conjunctivae anicteric. Tympanic membranes clear. Oropharynx clear.   Neck supple. No cervical lymphadenopathy, no thyroid enlargement.   Respiratory effort normal. Lungs clear to auscultation.   Heart: Regular rate and rhythm without murmurs, gallops or rubs.   ABDOMEN: soft, non distended, non tender, bowel sounds present, no hepatosplenomgaly        Labs 24 - hemoglobin A1C 6.6    ASSESSMENT AND PLAN:       1. Annual exam - discussed diet, exercise and safety issues.  2. Venous insufficiency/lymphedema - compression wrap and compression stockings.  Rx written for Circaid ankle wrap. Physical therapy for lymphedema  3. Morbid obesity - work on diet and exercise  4. S/P MI/BSO - doing well - no menopausal symptoms    5. Partial small bowel obstruction - doing well    6.  Left subclavian thrombosis - resolved - completed 3 months of xarelto    7. Hypertension - monitor BP at home. Add spironolactone 25 mg 1-2 tablets daily  8. Vitamin D deficency - vitamin D 5,000 units daily  9. Angiolipoma left breast - removed. MMG 23  10. Diabetes - hemoglobin A1C 6.6 - Ozempic 0.25 mg once weekly for 7 days  Screening - colonoscopy 14 due 10 years. Mammogram 2023. Bone density was normal 2014      I'll see her back in 4 months , sooner if problems arise.

## 2024-09-06 NOTE — PATIENT INSTRUCTIONS
Ozempic 0.25 mg once weekly for 4 weeks - if doing fine, will increase to 0.5 mg once weekly in one month - let me know

## 2024-09-09 LAB — ANA SER QL IF: NORMAL

## 2024-09-25 ENCOUNTER — CLINICAL SUPPORT (OUTPATIENT)
Dept: REHABILITATION | Facility: HOSPITAL | Age: 64
End: 2024-09-25
Payer: COMMERCIAL

## 2024-09-25 DIAGNOSIS — I89.0 LYMPHEDEMA: ICD-10-CM

## 2024-09-25 PROCEDURE — 97535 SELF CARE MNGMENT TRAINING: CPT

## 2024-09-25 PROCEDURE — 97165 OT EVAL LOW COMPLEX 30 MIN: CPT

## 2024-09-25 PROCEDURE — 97016 VASOPNEUMATIC DEVICE THERAPY: CPT

## 2024-09-26 NOTE — PLAN OF CARE
[facility] OUTPATIENT THERAPY AND WELLNESS  Occupational Therapy Initial Evaluation  Lymphedema      Name: Kurtis Gee  Clinic Number: [id number]    Therapy Diagnosis:   Encounter Diagnosis   Name Primary?    Lymphedema      Physician: Misty Clark MD    Physician Orders: Eval and Treat  Medical Diagnosis: I89.0 (ICD-10-CM) - Lymphedema   Evaluation Date: 9/25/2024  Insurance Authorization Period Expiration: 9/6/2025  Plan of Care Certification Period: 12/17/2024  Visit # / Visits authorized: 1 / 1  FOTO: see media, 1 / 3    Precautions:  Standard    Time In: 5:30   Time Out: 6:30  Total Appointment Time (timed & untimed codes): 60 minutes    Subjective      Date of onset: Multiple years ago  History of current condition - KURTIS reports: Completed therapy with this therapist in March 2024. She has been wearing compression stockings with foot/ankle Velcro wraps over but feels the they do not help swelling. She did not end up purchasing pneumatic compression pump through [employer] as she felt the representative did not go about obtaining her medical records the correct way. She is still interested in purchasing a pump. She was referred back by her PCP at annual visit. She is interested in trying a different brand foot/ankle Velcro wrap.    Previous Lymphedema Therapy: yes, discharged March 2024  Wears Compression: yes      Pain:  Functional Pain Scale Rating 0-10:   0/10 on average  0/10 at best  0/10 at worst      Occupation:        Functional Limitations/Social History:    Previous functional status includes: Independent with all ADLs.   Recent falls: no    Current Functional Status   Home/Living environment: lives alone       Limitation of Functional Status as follows:   ADLs/IADLs:     - Feeding: none    - Bathing: none    - Dressing/Grooming: none    - Home Management: none    - Driving: none    Patient's Goals for Therapy: Reduce foot/ankle swelling, purchase pump and new Velcro wraps.     Past  Medical History/Physical Systems Review:   Marita Gee  has a past medical history of Breast cyst, History of cellulitis , HTN (hypertension), Obesity, Partial small bowel obstruction, Subclavian artery thrombosis, Type 2 diabetes mellitus without complication, with long-term current use of insulin, Venous insufficiency, and Vitamin D deficiency disease.    Marita Gee  has a past surgical history that includes Appendectomy (2002); Umbilical hernia repair (2003); Dilation and curettage of uterus (2008); bowel obstruction (2002); Total abdominal hysterectomy w/ bilateral salpingoophorectomy (9/2013); Hysterectomy (09/04/2013); Breast biopsy (Left, 2018); Colonoscopy; and Excisional biopsy (Left, 1/14/2021).    Marita has a current medication list which includes the following prescription(s): cholecalciferol (vitamin d3), doxycycline, l.acidoph,plant/b.animal,long, multivitamin, ozempic, spironolactone, altreno, and valsartan-hydrochlorothiazide.    Review of patient's allergies indicates:   Allergen Reactions    Bactrim [sulfamethoxazole-trimethoprim]      rash        Pt denies: CHF,CKD, DVT, CA, infection, severe PAD      Objective      Patient received from waiting room.   Mental status: alert, oriented to person, place, and time  Appearance: Well groomed  Behavior:  calm and cooperative  Attention Span and Concentration:  Normal    Affected Areas: RLE and LLE  Refill: Day and Night   Stemmer Sign: Positive  Shape: increased girth, ankles>calves  Tissue Texture: soft, pliable, pitting  Skin Integrity: hemosiderin staining and hyperpigmentation  Sensation: intact  Circulation: intact      LE Girth Measurements: taken in supine  LANDMARK RIGHT LE  9/25/24 LEFT LE  9/25/24   Length- calf 35.0 cm 35.0 cm   Length- foot 18.0 cm 18.0 cm   Calf 42.5 cm 44.0 cm   Ankle 32.5 cm 34.0 cm   Forefoot 26.5 cm 28.5 cm       Picture of BLE in supine; taken via [employer] on therapist's cell phone with verbal consent  from patient:        Limitation/Restriction for FOTO Lower Quadrant Edema Survey    Therapist reviewed FOTO scores for Kurtis Gee on 9/25/2024.   FOTO documents entered into [employer] - see Media section.    Limitation Score: see media         Treatment      Total Treatment time (time-based codes) separate from Evaluation: 25 minutes    KURTIS received the treatments listed below:        Self-care/home management techniques to improve functional performance for 25  minutes, including:    SEQUENTIAL COMPRESSION PUMP: full leg sleeve applied to B LE  VES 5200 with default setting with distal pressures starting at 45mmHg entire LE simultaneous to education     Pt was educated in potential compression needs.  Demo of products including socks, garments, and Inelastic Velcro wraps.   Discussed cost/coverage and authorization per insurance with Durable Medical Equipment(DME) provider.  Compression require orders from referring provider and coverage or purchase of products from DME or self order.      Discussed wear schedule, don/doff, wash and management of products.  Size and compression class and AM/PM needs.    Consideration for Edema Wear as form of temporary compression use until securing compression needs.   Consideration for shorts/tights or capris style compression to compliment lower leg compression to support size/shape of LE.   Product information provided.   Vendor list provided.    Informed insurance coverage of compression is per DME provider and typically Medicare and Medicare group plans may not cover cost beyond pair of standard sized knee high garments.   Commitment to attendance as well as commitment to securing compression needs is critical to edema management.     Patient Education and Home Exercises      Education provided:   1. Educated on definition of lymphedema.  2. Explained the Complete Decongestive Therapy protocol in depth  3. Educated on Phase 1 and 2 of protocol.  4. Reviewed treatment  frequency and likely duration of weeks  5.Contraindications for treatment.  6. Plan of care and goals.  7. Educated on home management protocols.   8. Role of OT, goals for OT, scheduling/cancellations, insurance limitations.  9. Provided lymphedema educational pamphlet          Assessment      Marita is a 64 y.o. female referred to outpatient Occupational Therapy with a medical diagnosis of lymphedema. This patient presents with stage 2 lymphedema due to CVI.  Patient's deficits include swelling of the BLE, increased pain, increased stiffness in the BLE, as well as difficulty performing ADLs , and placing the pt at higher risk of infection. Therapist's recommendation includes elevation, exercise, manual lymphatic drainage, pneumatic compression pump, multi-layer bandaging, and compression garments for 6 weeks to reduce swelling. Marita would benefit from complete decongestive therapy to reduce size of BLE, reduce risk of wounds and poor skin integrity as well as education to self-maintain reduction with use of compression garments.    Anticipated barriers to occupational therapy: none    Plan of care discussed with patient: Yes  Patient's spiritual, cultural and educational needs considered and patient is agreeable to the plan of care and goals as stated below:     Medical Necessity is demonstrated by the following  Occupational Profile/History  Co-morbidities and personal factors that may impact the plan of care [x] LOW: Brief chart review  [] MODERATE: Expanded chart review   [] HIGH: Extensive chart review    Moderate / High Support Documentation: N/A     Examination  Performance deficits relating to physical, cognitive or psychosocial skills that result in activity limitations and/or participation restrictions  [x] LOW: addressing 1-3 Performance deficits  [] MODERATE: 3-5 Performance deficits  [] HIGH: 5+ Performance deficits (please support below)    Moderate / High Support  Documentation:    Physical:  Skin Integrity/Scar Formation  Edema    Cognitive:  No Deficits    Psychosocial:    No Deficits     Treatment Options [] LOW: Limited options  [x] MODERATE: Several options  [] HIGH: Multiple options      Decision Making/ Complexity Score: low       The following goals were discussed with the patient and patient is in agreement with them as to be addressed in the treatment plan.     Goals:     Short Term Goals: (3 weeks)  Goals: Progressing / MET   1. Patient will demonstrate 100% knowledge of lymphedema precautions and signs of infection to allow for reduced lymphedema risk, infection risk, and/or exacerbation of condition.  NOT MET   2. Patient will tolerate daily activities wearing multilayered bandaging to allow for lymphatic drainage support, skin elasticity, and reduction in shape and size of limb.  NOT MET   3. Patient and/or caregiver will order/obtain appropriate compression garments to maintain lymphatic and venous support.  NOT MET   4. Patient will show decreased total girth measurement in BLE by up to 2 cm to allow for lower extremity symmetry, shoe and clothing choice, and ability to apply needed compression. NOT MET     Long Term Goals: (6 weeks)  Goals: Progressing / MET   1. Patient and/or caregiver to yari/doff compression garment independently and with daily compliance to assist in lymphedema management, skin elasticity, and tissue density NOT MET   2. Patient and/or caregiver will be independent in use of pneumatic compression pump or self manual lymphatic drainage techniques to areas within reach to enhance lymphatic drainage and skin condition.  NOT MET   3. Patient to be independent and compliant with home exercise program to allow for increased function in affected limb. NOT MET   4. Patient will show decreased total girth measurement in BLE by up to 5 cm  to allow for lower extremity symmetry, shoe and clothing choice, and ability to apply needed compression  daily. NOT MET        Plan     Plan of Care Certification: 9/25/2024 to 12/17/2024.     Therapy Track: COMPLETE DECONGESTIVE THERAPY  Interventions: manual lymphatic drainage, multi-layer bandaging, compression garments, therapeutic exercise, self bandaging and manual lymphatic drainage training, home exercise programming.      Outpatient Occupational Therapy 1-2 times weekly for 6 weeks to include the following interventions: Manual therapy/joint mobilizations, Therapeutic exercises/activities., and Edema Control.    Erna Borrego, OT, CLWT      I CERTIFY THE NEED FOR THESE SERVICES FURNISHED UNDER THIS PLAN OF TREATMENT AND WHILE UNDER MY CARE   Physician's comments:     Physician's Signature: ___________________________________________________

## 2024-10-17 ENCOUNTER — CLINICAL SUPPORT (OUTPATIENT)
Dept: REHABILITATION | Facility: HOSPITAL | Age: 64
End: 2024-10-17
Payer: COMMERCIAL

## 2024-10-17 DIAGNOSIS — I89.0 LYMPHEDEMA: Primary | ICD-10-CM

## 2024-10-17 PROCEDURE — 97016 VASOPNEUMATIC DEVICE THERAPY: CPT

## 2024-10-17 PROCEDURE — 97140 MANUAL THERAPY 1/> REGIONS: CPT
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
## 2024-10-18 ENCOUNTER — TELEPHONE (OUTPATIENT)
Dept: PHARMACY | Facility: CLINIC | Age: 64
End: 2024-10-18
Payer: COMMERCIAL

## 2024-10-18 NOTE — TELEPHONE ENCOUNTER
[facility]/Population Health Chart Review & Patient Outreach Details For Medication Adherence Project    Reason for Outreach Encounter: 3rd Party payor non-compliance report ([employer], [employer], C, etc)  2.  Patient Outreach Method: Reviewed patient chart  and [employer] message  3.   Medication in question:   Hypertension Medications               spironolactone (ALDACTONE) 25 MG tablet TAKE 1 TO 2 TABLETS BY MOUTH DAILY FOR BLOOD PRESSURE    valsartan-hydrochlorothiazide (DIOVAN-HCT) 320-25 mg per tablet Take 1 tablet by mouth once daily.                 Valsartan/HCTZ  last filled  5/29/24 for 90 day supply      4.  Reviewed and or Updates Made To: Patient Chart  5. Outreach Outcomes and/or actions taken: Sent inquiry to patient: Waiting for response  Additional Notes:

## 2024-10-24 ENCOUNTER — CLINICAL SUPPORT (OUTPATIENT)
Dept: REHABILITATION | Facility: HOSPITAL | Age: 64
End: 2024-10-24
Payer: COMMERCIAL

## 2024-10-24 DIAGNOSIS — I89.0 LYMPHEDEMA: Primary | ICD-10-CM

## 2024-10-24 PROCEDURE — 97016 VASOPNEUMATIC DEVICE THERAPY: CPT

## 2024-10-24 PROCEDURE — 97535 SELF CARE MNGMENT TRAINING: CPT

## 2024-10-24 PROCEDURE — 97140 MANUAL THERAPY 1/> REGIONS: CPT

## 2024-10-30 ENCOUNTER — CLINICAL SUPPORT (OUTPATIENT)
Dept: REHABILITATION | Facility: HOSPITAL | Age: 64
End: 2024-10-30
Payer: COMMERCIAL

## 2024-10-30 DIAGNOSIS — I89.0 LYMPHEDEMA: Primary | ICD-10-CM

## 2024-10-30 PROCEDURE — 97140 MANUAL THERAPY 1/> REGIONS: CPT

## 2024-10-30 PROCEDURE — 97016 VASOPNEUMATIC DEVICE THERAPY: CPT

## 2024-10-31 ENCOUNTER — TELEPHONE (OUTPATIENT)
Dept: INTERNAL MEDICINE | Facility: CLINIC | Age: 64
End: 2024-10-31
Payer: COMMERCIAL

## 2024-11-02 ENCOUNTER — TELEPHONE (OUTPATIENT)
Dept: ENDOSCOPY | Facility: HOSPITAL | Age: 64
End: 2024-11-02
Payer: COMMERCIAL

## 2024-11-02 DIAGNOSIS — Z12.11 SCREENING FOR COLON CANCER: Primary | ICD-10-CM

## 2024-11-06 ENCOUNTER — CLINICAL SUPPORT (OUTPATIENT)
Dept: REHABILITATION | Facility: HOSPITAL | Age: 64
End: 2024-11-06
Payer: COMMERCIAL

## 2024-11-06 DIAGNOSIS — I89.0 LYMPHEDEMA: Primary | ICD-10-CM

## 2024-11-06 PROCEDURE — 97140 MANUAL THERAPY 1/> REGIONS: CPT

## 2024-11-06 PROCEDURE — 97535 SELF CARE MNGMENT TRAINING: CPT

## 2024-11-06 PROCEDURE — 97016 VASOPNEUMATIC DEVICE THERAPY: CPT

## 2024-11-12 ENCOUNTER — TELEPHONE (OUTPATIENT)
Dept: PHARMACY | Facility: CLINIC | Age: 64
End: 2024-11-12
Payer: COMMERCIAL

## 2024-11-12 NOTE — TELEPHONE ENCOUNTER
[facility]/Population Health Chart Review & Patient Outreach Details For Medication Adherence Project    Reason for Outreach Encounter: 3rd Party payor non-compliance report ([employer], [employer], C, etc)  2.  Patient Outreach Method: Reviewed patient chart  and [employer] message  3.   Medication in question: valsartan-hctz 320-25 mg     Valsartan-hctz  last filled  5/29/24 for 90 day supply    4.  Reviewed and or Updates Made To: Patient Chart  5. Outreach Outcomes and/or actions taken: Sent inquiry to patient: Waiting for response  Additional Notes:

## 2024-11-13 NOTE — PROGRESS NOTES
[facility]  Occupational Therapy Treatment Note  Lymphedema    Date: 11/6/2024  Name: Kurtis Gee  Clinic Number: [id number]    Therapy Diagnosis:   Encounter Diagnosis   Name Primary?    Lymphedema Yes       Physician: Misty Clark MD    Physician Orders: Eval and Treat  Medical Diagnosis: I89.0 (ICD-10-CM) - Lymphedema   Evaluation Date: 9/25/2024  Insurance Authorization Period Expiration: 9/6/2025  Plan of Care Certification Period: 12/17/2024  Visit # / Visits authorized: 13 / 20  FOTO: see media, 1 / 3     Precautions:  Standard       Time In: 5:30  Time Out: 6:30  Total Billable Time: 50 minutes     SUBJECTIVE     Pt reports: No concerns.   KURTIS reported wearing compression outside of therapy visits: Yes -   She was compliant with home exercise program given last session.   Response to previous treatment:no change  Functional change: none    Pain: did not quantify      OBJECTIVE     Pt arrived wearing compression , in NAD.    Compression garment status: owns knee highs and foot/ankle Velcro wraps  Compression Rx status: sent to DME team  Pneumatic pump status: sent to [employer]    Objective Measures updated at progress report unless specified.    Treatment     KURTIS received the treatments listed below:       Manual therapy techniques were applied for 10 minutes, including:    SEQUENTIAL COMPRESSION PUMP:   Full leg sleeve applied to BLE  Airos 6 with distal pressures starting at 45mmHg entire LE       MULTILAYERED BANDAGING:    Issued supplies and bandaged BLE   Use of Cavilon moisturizer for dry skin  Cotton stockinet: size 9  Cellona padding from dorsum of foot to knee,   2-6cm 2-8cm and 2-10cm Rosidal K rolls foot to distal knee  Elastomull to toes    Pt to leave intact 24-48 hrs as tolerated, discontinue with any problems, return rolled bandages next session. Wash and wear schedules confirmed.     Self-care/home management techniques to improve functional  performance for 30  minutes, including:    Patient was educated in   Compression needs: 20-30 mm hg knee highs and inelastic Velcro wrap- foot/ankle  Wear schedule: Joce first thing in the morning, remove at the end of the day as close to bedtime as possible. Do not sleep in garments. If using a home pneumatic pump, remove garments when using pump. If it is not yet bedtime, resume wearing garments until bed.  Wear schedule: Joce garments after bathing/skin-care, remove every 24 hours for short skin break, bathing, and skin hygiene, and then resume wearing. If using a home pneumatic pump, remove garments when using pump and resume wearing after.  Donning/doffing techniques    Practiced donning/doffing inelastic Velcro wrap- foot/ankle garment x  2 repetitions        Patient Education and Home Exercises      Education provided:   -see above  - Progress towards goals        Assessment     Edema appears to be reducing. Practiced donning/doffing inelastic Velcro wraps. Proper fit observed.     Pt would continue to benefit from skilled OT.      KURTIS is progressing well towards her goals and there are no updates to goals at this time. Pt prognosis is Good.     Pt will continue to benefit from skilled outpatient occupational therapy to address the deficits listed in the problem list on initial evaluation provide pt/family education and to maximize pt's level of independence in the home and community environment.     Pt's spiritual, cultural and educational needs considered and pt agreeable to plan of care and goals.    Anticipated barriers to occupational therapy: none    Goals:    Short Term Goals: (3 weeks)  Goals: Progressing / MET   1. Patient will demonstrate 100% knowledge of lymphedema precautions and signs of infection to allow for reduced lymphedema risk, infection risk, and/or exacerbation of condition.  NOT MET   2. Patient will tolerate daily activities wearing multilayered bandaging to allow for lymphatic  drainage support, skin elasticity, and reduction in shape and size of limb.  NOT MET   3. Patient and/or caregiver will order/obtain appropriate compression garments to maintain lymphatic and venous support.  NOT MET   4. Patient will show decreased total girth measurement in BLE by up to 2 cm to allow for lower extremity symmetry, shoe and clothing choice, and ability to apply needed compression. NOT MET      Long Term Goals: (6 weeks)  Goals: Progressing / MET   1. Patient and/or caregiver to yari/doff compression garment independently and with daily compliance to assist in lymphedema management, skin elasticity, and tissue density NOT MET   2. Patient and/or caregiver will be independent in use of pneumatic compression pump or self manual lymphatic drainage techniques to areas within reach to enhance lymphatic drainage and skin condition.  NOT MET   3. Patient to be independent and compliant with home exercise program to allow for increased function in affected limb. NOT MET   4. Patient will show decreased total girth measurement in BLE by up to 5 cm  to allow for lower extremity symmetry, shoe and clothing choice, and ability to apply needed compression daily. NOT MET          PLAN     Updates/Grading for next session: none    Erna Borrego, OT, CLWT

## 2024-12-16 ENCOUNTER — ANESTHESIA EVENT (OUTPATIENT)
Dept: ENDOSCOPY | Facility: HOSPITAL | Age: 64
End: 2024-12-16
Payer: COMMERCIAL

## 2024-12-16 NOTE — ANESTHESIA PREPROCEDURE EVALUATION
12/16/2024  Marita Gee is a 64 y.o., female.  [facility]  Anesthesia Pre-Operative Evaluation       Patient Name: Marita Gee  YOB: 1960  MRN: [id number]  [facility]: [id number]      Code Status: Prior   Date of Procedure: 12/27/2024  Anesthesia: Choice Procedure: Procedure(s) (LRB):  COLONOSCOPY, SCREENING, LOW RISK PATIENT (N/A)  Pre-Operative Diagnosis: Screening for colon cancer [Z12.11]  Proceduralist: Surgeons and Role:     * Frankie Lopez MD - Primary        SUBJECTIVE:   Marita Gee is a 64 y.o. 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     she has a current medication list which includes the following long-term medication(s): spironolactone and valsartan-hydrochlorothiazide.     ALLERGIES:     Review of patient's allergies indicates:   Allergen Reactions    Bactrim [sulfamethoxazole-trimethoprim]      rash     LDA:          Lines/Drains/Airways       None                  Anesthesia Evaluation      Airway   Mallampati: II  TM distance: Normal  Neck ROM: Normal ROM  Dental      Pulmonary    Cardiovascular   Exercise tolerance: good  (+) hypertension    Neuro/Psych      GI/Hepatic/Renal      Endo/Other    (+) diabetes mellitus type 2  Abdominal                   MEDICATIONS:     Antibiotics (From admission, onward)      None          VTE Risk Mitigation (From admission, onward)      None              No current facility-administered medications for this encounter.     Current Outpatient Medications   Medication Sig  Dispense Refill    cholecalciferol, vitamin D3, 125 mcg (5,000 unit) Tab Take 5,000 Units by mouth once daily.      doxycycline (MONODOX) 100 MG capsule Take 1 capsule (100 mg total) by mouth 2 (two) times daily. (Patient not taking: Reported on 9/6/2024) 20 capsule 2    L.acidoph/B.long/L.plant/B.lac (PROBIOTIC ACIDOPHILUS BEADS ORAL) Take by mouth.      multivitamin capsule Take 1 capsule by mouth once daily.      semaglutide (OZEMPIC) 0.25 mg or 0.5 mg (2 mg/3 mL) pen injector Inject 0.25 mg into the skin every 7 days. 1 each 1    spironolactone (ALDACTONE) 25 MG tablet TAKE 1 TO 2 TABLETS BY MOUTH DAILY FOR BLOOD PRESSURE 180 tablet 2    tretinoin (ALTRENO) 0.05 % Lotn Apply once daily 20 g 3    valsartan-hydrochlorothiazide (DIOVAN-HCT) 320-25 mg per tablet Take 1 tablet by mouth once daily. 90 tablet 3          History:   There are no hospital problems to display for this patient.    Surgical History:    has a past surgical history that includes Appendectomy (2002); Umbilical hernia repair (2003); Dilation and curettage of uterus (2008); bowel obstruction (2002); Total abdominal hysterectomy w/ bilateral salpingoophorectomy (9/2013); Hysterectomy (09/04/2013); Breast biopsy (Left, 2018); Colonoscopy; and Excisional biopsy (Left, 1/14/2021).   Social History:    reports never being sexually active.  reports that she quit smoking about 22 years ago. Her smoking use included cigarettes. She started smoking about 34 years ago. She has a 6 pack-year smoking history. She has never used smokeless tobacco. She reports current alcohol use. She reports that she does not use drugs.     OBJECTIVE:     Vital Signs (Most Recent):    Vital Signs Range (Last 24H):  BP: ()/()   Arterial Line BP: ()/()        There is no height or weight on file to calculate BMI.   Wt Readings from Last 4 Encounters:   09/06/24 120 kg (264 lb 8.8 oz)   09/05/23 115.7 kg (255 lb)   09/02/22 120.7 kg (266 lb 1.5 oz)   07/05/22 118.4 kg  "(261 lb)       Significant Labs:  Lab Results   Component Value Date    WBC 6.38 08/31/2024    HGB 13.0 08/31/2024    HCT 38.2 08/31/2024     (L) 08/31/2024     08/31/2024    K 4.7 08/31/2024     08/31/2024    CREATININE 0.9 08/31/2024    BUN 14 08/31/2024    CO2 24 08/31/2024     (H) 08/31/2024    CALCIUM 9.2 08/31/2024    MG 2.2 09/24/2013    PHOS 3.7 09/24/2013    ALKPHOS 86 08/31/2024    ALT 28 08/31/2024    AST 29 08/31/2024    ALBUMIN 3.6 08/31/2024    INR 1.0 04/13/2009    APTT 25.0 04/13/2009    HGBA1C 6.6 (H) 08/31/2024     Patient's last menstrual period was 08/19/2013.  No results found for this or any previous visit (from the past 72 hours).    EKG:   Results for orders placed or performed during the hospital encounter of 01/11/21   EKG 12-lead    Collection Time: 01/11/21  2:52 PM    Narrative    Test Reason : Z01.818,    Vent. Rate : 085 BPM     Atrial Rate : 085 BPM     P-R Int : 174 ms          QRS Dur : 086 ms      QT Int : 394 ms       P-R-T Axes : 060 -45 095 degrees     QTc Int : 468 ms    Normal sinus rhythm  Left atrial enlargement  Left axis deviation  Left ventricular hypertrophy  T wave abnormality, consider lateral ischemia and/or ST and/or T wave  abnormalities secondary to hypertrophy  Abnormal ECG  When compared with ECG of 06-AUG-2013 11:34,  T wave inversion less evident in Lateral leads  Confirmed by PARUL BUNDY MD ([id number]) on 1/11/2021 3:36:11 PM    Referred By: CARY ARMAS           Confirmed By:PARUL BUNDY MD       TTE:  No results found for this or any previous visit.  No results found for: "EF"   No results found for this or any previous visit.  ERICK:  No results found for this or any previous visit.  Stress Test:  No results found for this or any previous visit.     LHC:  No results found for this or any previous visit.     PFT:  No results found for: "FEV1", "FVC", "CQA2DAS", "TLC", "DLCO"     ASSESSMENT/PLAN:        Pre-op Assessment    I have reviewed the " Patient Summary Reports.     I have reviewed the Nursing Notes. I have reviewed the NPO Status.   I have reviewed the Medications.     Review of Systems  Anesthesia Hx:  No problems with previous Anesthesia             Denies Family Hx of Anesthesia complications.    Denies Personal Hx of Anesthesia complications.                    Hematology/Oncology:  Hematology Normal   Oncology Normal                                   EENT/Dental:  EENT/Dental Normal           Cardiovascular:  Exercise tolerance: good   Hypertension                                          Pulmonary:  Pulmonary Normal                       Renal/:  Renal/ Normal                 Hepatic/GI:  Hepatic/GI Normal                    Musculoskeletal:  Musculoskeletal Normal                Neurological:  Neurology Normal                                      Endocrine:  Diabetes, type 2   Vitamin D def      Morbid Obesity / BMI > 40  Dermatological:  Skin Normal    Psych:  Psychiatric Normal                  Physical Exam  General: Well nourished, Cooperative, Alert and Oriented    Airway:  Mallampati: II   Mouth Opening: Normal  TM Distance: Normal  Tongue: Normal  Neck ROM: Normal ROM    Anesthesia Plan  Type of Anesthesia, risks & benefits discussed:    Anesthesia Type: Gen Natural Airway  Intra-op Monitoring Plan: Standard ASA Monitors  Post Op Pain Control Plan: multimodal analgesia  Induction:  IV  Informed Consent: Informed consent signed with the Patient and all parties understand the risks and agree with anesthesia plan.  All questions answered.   ASA Score: 3  Day of Surgery Review of History & Physical: H&P Update referred to the surgeon/provider.    Ready For Surgery From Anesthesia Perspective.     .

## 2024-12-18 ENCOUNTER — PATIENT MESSAGE (OUTPATIENT)
Dept: ENDOSCOPY | Facility: HOSPITAL | Age: 64
End: 2024-12-18
Payer: COMMERCIAL

## 2024-12-20 ENCOUNTER — TELEPHONE (OUTPATIENT)
Dept: PHARMACY | Facility: CLINIC | Age: 64
End: 2024-12-20
Payer: COMMERCIAL

## 2024-12-20 ENCOUNTER — TELEPHONE (OUTPATIENT)
Dept: ENDOSCOPY | Facility: HOSPITAL | Age: 64
End: 2024-12-20
Payer: COMMERCIAL

## 2024-12-20 ENCOUNTER — PATIENT MESSAGE (OUTPATIENT)
Dept: INTERNAL MEDICINE | Facility: CLINIC | Age: 64
End: 2024-12-20
Payer: COMMERCIAL

## 2024-12-20 DIAGNOSIS — K76.89 LIVER CYST: Primary | ICD-10-CM

## 2024-12-20 NOTE — TELEPHONE ENCOUNTER
Referral for procedure from Telephone call - direct access patient      Spoke to patient to schedule procedure(s) Colonoscopy       Physician to perform procedure(s) Dr. EMA Keene  Date of Procedure (s) 2/17/25  Arrival Time 10:00 AM  Time of Procedure(s) 11:00 AM   Location of Procedure(s) [place] [address] Floor  Type of Rx Prep sent to patient: PEG  Instructions provided to [facility] via [facility]    Patient was informed on the following information and verbalized understanding. Screening questionnaire reviewed with patient and complete. If procedure requires anesthesia, a responsible adult needs to be present to accompany the patient home, patient cannot drive after receiving anesthesia. Appointment details are tentative, especially check-in time. Patient will receive a prep-op call 7 days prior to confirm check-in time for procedure. If applicable the patient should contact their pharmacy to verify Rx for procedure prep is ready for pick-up. Patient was advised to call the scheduling department at [phone] if pharmacy states no Rx is available. Patient was advised to call the endoscopy scheduling department if any questions or concerns arise.      [facility] Scheduling Department

## 2024-12-20 NOTE — TELEPHONE ENCOUNTER
[facility]/Population Health Chart Review & Patient Outreach Details For Medication Adherence Project    Reason for Outreach Encounter: 3rd Party payor non-compliance report ([employer], [employer], [facility], etc)  2.  Patient Outreach Method: [employer] message  3.   Medication in question: valsartan-hctz   LAST FILLED: 5/29/24 for 90 day supply  Hypertension Medications               spironolactone (ALDACTONE) 25 MG tablet TAKE 1 TO 2 TABLETS BY MOUTH DAILY FOR BLOOD PRESSURE    valsartan-hydrochlorothiazide (DIOVAN-HCT) 320-25 mg per tablet Take 1 tablet by mouth once daily.              4.  Reviewed and or Updates Made To: Patient Chart  5. Outreach Outcomes and/or actions taken: Sent inquiry to patient: Waiting for response.

## 2024-12-21 RX ORDER — VALSARTAN AND HYDROCHLOROTHIAZIDE 320; 25 MG/1; MG/1
1 TABLET, FILM COATED ORAL DAILY
Qty: 90 TABLET | Refills: 3 | Status: SHIPPED | OUTPATIENT
Start: 2024-12-21 | End: 2025-12-21

## 2024-12-21 NOTE — TELEPHONE ENCOUNTER
Care Due:                  Date            Visit Type   Department     Provider  --------------------------------------------------------------------------------                                EP -                              PRIMARY      [facility] INTERNAL  Last Visit: 09-      CARE ([facility])   MEDICINE       KATLYN ARCE                              EP -                              PRIMARY      [facility] INTERNAL  Next Visit: 01-      CARE ([facility])   MEDICINE       KATLYN ARCE                                                            Last  Test          Frequency    Reason                     Performed    Due Date  --------------------------------------------------------------------------------    HBA1C.......  6 months...  semaglutide..............  08- 02-    [facility] Embedded Care Due Messages. Reference number: [id number].   12/20/2024 6:16:06 PM CST

## 2024-12-23 ENCOUNTER — LAB VISIT (OUTPATIENT)
Dept: LAB | Facility: HOSPITAL | Age: 64
End: 2024-12-23
Attending: INTERNAL MEDICINE
Payer: COMMERCIAL

## 2024-12-23 DIAGNOSIS — E11.9 TYPE 2 DIABETES MELLITUS WITHOUT COMPLICATION, WITHOUT LONG-TERM CURRENT USE OF INSULIN: ICD-10-CM

## 2024-12-23 LAB
ALBUMIN SERPL BCP-MCNC: 3.8 G/DL (ref 3.5–5.2)
ALP SERPL-CCNC: 108 U/L (ref 40–150)
ALT SERPL W/O P-5'-P-CCNC: 25 U/L (ref 10–44)
ANION GAP SERPL CALC-SCNC: 7 MMOL/L (ref 8–16)
AST SERPL-CCNC: 29 U/L (ref 10–40)
BASOPHILS # BLD AUTO: 0.06 K/UL (ref 0–0.2)
BASOPHILS NFR BLD: 0.8 % (ref 0–1.9)
BILIRUB SERPL-MCNC: 0.5 MG/DL (ref 0.1–1)
BUN SERPL-MCNC: 15 MG/DL (ref 8–23)
CALCIUM SERPL-MCNC: 9.7 MG/DL (ref 8.7–10.5)
CHLORIDE SERPL-SCNC: 104 MMOL/L (ref 95–110)
CHOLEST SERPL-MCNC: 164 MG/DL (ref 120–199)
CHOLEST/HDLC SERPL: 4.2 {RATIO} (ref 2–5)
CO2 SERPL-SCNC: 29 MMOL/L (ref 23–29)
CREAT SERPL-MCNC: 0.9 MG/DL (ref 0.5–1.4)
DIFFERENTIAL METHOD BLD: ABNORMAL
EOSINOPHIL # BLD AUTO: 0.1 K/UL (ref 0–0.5)
EOSINOPHIL NFR BLD: 1.3 % (ref 0–8)
ERYTHROCYTE [DISTWIDTH] IN BLOOD BY AUTOMATED COUNT: 13.1 % (ref 11.5–14.5)
EST. GFR  (NO RACE VARIABLE): >60 ML/MIN/1.73 M^2
ESTIMATED AVG GLUCOSE: 117 MG/DL (ref 68–131)
GLUCOSE SERPL-MCNC: 118 MG/DL (ref 70–110)
HBA1C MFR BLD: 5.7 % (ref 4–5.6)
HCT VFR BLD AUTO: 40.1 % (ref 37–48.5)
HDLC SERPL-MCNC: 39 MG/DL (ref 40–75)
HDLC SERPL: 23.8 % (ref 20–50)
HGB BLD-MCNC: 13.6 G/DL (ref 12–16)
IMM GRANULOCYTES # BLD AUTO: 0.02 K/UL (ref 0–0.04)
IMM GRANULOCYTES NFR BLD AUTO: 0.3 % (ref 0–0.5)
LDLC SERPL CALC-MCNC: 114.6 MG/DL (ref 63–159)
LYMPHOCYTES # BLD AUTO: 3.1 K/UL (ref 1–4.8)
LYMPHOCYTES NFR BLD: 40.3 % (ref 18–48)
MCH RBC QN AUTO: 32.5 PG (ref 27–31)
MCHC RBC AUTO-ENTMCNC: 33.9 G/DL (ref 32–36)
MCV RBC AUTO: 96 FL (ref 82–98)
MONOCYTES # BLD AUTO: 0.6 K/UL (ref 0.3–1)
MONOCYTES NFR BLD: 8 % (ref 4–15)
NEUTROPHILS # BLD AUTO: 3.8 K/UL (ref 1.8–7.7)
NEUTROPHILS NFR BLD: 49.3 % (ref 38–73)
NONHDLC SERPL-MCNC: 125 MG/DL
NRBC BLD-RTO: 0 /100 WBC
PLATELET # BLD AUTO: 191 K/UL (ref 150–450)
PMV BLD AUTO: 11.1 FL (ref 9.2–12.9)
POTASSIUM SERPL-SCNC: 4.3 MMOL/L (ref 3.5–5.1)
PROT SERPL-MCNC: 7.6 G/DL (ref 6–8.4)
RBC # BLD AUTO: 4.19 M/UL (ref 4–5.4)
SODIUM SERPL-SCNC: 140 MMOL/L (ref 136–145)
TRIGL SERPL-MCNC: 52 MG/DL (ref 30–150)
WBC # BLD AUTO: 7.76 K/UL (ref 3.9–12.7)

## 2024-12-23 PROCEDURE — 85025 COMPLETE CBC W/AUTO DIFF WBC: CPT | Performed by: INTERNAL MEDICINE

## 2024-12-23 PROCEDURE — 80061 LIPID PANEL: CPT | Performed by: INTERNAL MEDICINE

## 2024-12-23 PROCEDURE — 36415 COLL VENOUS BLD VENIPUNCTURE: CPT | Performed by: INTERNAL MEDICINE

## 2024-12-23 PROCEDURE — 80053 COMPREHEN METABOLIC PANEL: CPT | Performed by: INTERNAL MEDICINE

## 2024-12-23 PROCEDURE — 83036 HEMOGLOBIN GLYCOSYLATED A1C: CPT | Performed by: INTERNAL MEDICINE

## 2024-12-23 RX ORDER — SEMAGLUTIDE 0.68 MG/ML
0.25 INJECTION, SOLUTION SUBCUTANEOUS
Qty: 1 EACH | Refills: 1 | Status: SHIPPED | OUTPATIENT
Start: 2024-12-23

## 2024-12-23 NOTE — TELEPHONE ENCOUNTER
No care due was identified.  [facility] Embedded Care Due Messages. Reference number: [id number].   12/23/2024 11:41:39 AM CST

## 2024-12-27 ENCOUNTER — ANESTHESIA (OUTPATIENT)
Dept: ENDOSCOPY | Facility: HOSPITAL | Age: 64
End: 2024-12-27
Payer: COMMERCIAL

## 2025-01-03 ENCOUNTER — OFFICE VISIT (OUTPATIENT)
Dept: INTERNAL MEDICINE | Facility: CLINIC | Age: 65
End: 2025-01-03
Payer: COMMERCIAL

## 2025-01-03 VITALS
DIASTOLIC BLOOD PRESSURE: 80 MMHG | BODY MASS INDEX: 40.39 KG/M2 | HEART RATE: 71 BPM | OXYGEN SATURATION: 97 % | WEIGHT: 251.31 LBS | HEIGHT: 66 IN | SYSTOLIC BLOOD PRESSURE: 122 MMHG

## 2025-01-03 DIAGNOSIS — I10 PRIMARY HYPERTENSION: ICD-10-CM

## 2025-01-03 DIAGNOSIS — E55.9 VITAMIN D DEFICIENCY DISEASE: Primary | ICD-10-CM

## 2025-01-03 DIAGNOSIS — E11.9 TYPE 2 DIABETES MELLITUS WITHOUT COMPLICATION, WITHOUT LONG-TERM CURRENT USE OF INSULIN: ICD-10-CM

## 2025-01-03 DIAGNOSIS — E66.01 SEVERE OBESITY (BMI 35.0-35.9 WITH COMORBIDITY): ICD-10-CM

## 2025-01-03 DIAGNOSIS — I89.0 LYMPHEDEMA: ICD-10-CM

## 2025-01-03 PROCEDURE — 99999 PR PBB SHADOW E&M-EST. PATIENT-LVL IV: CPT | Mod: PBBFAC,,, | Performed by: INTERNAL MEDICINE

## 2025-01-03 RX ORDER — IBUPROFEN 800 MG/1
800 TABLET ORAL EVERY 8 HOURS PRN
COMMUNITY
Start: 2024-10-08

## 2025-01-03 RX ORDER — SEMAGLUTIDE 0.68 MG/ML
0.5 INJECTION, SOLUTION SUBCUTANEOUS
Qty: 3 ML | Refills: 1 | Status: SHIPPED | OUTPATIENT
Start: 2025-01-03

## 2025-01-03 RX ORDER — CYCLOBENZAPRINE HCL 10 MG
10 TABLET ORAL
COMMUNITY
Start: 2024-10-08
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
## 2025-01-10 ENCOUNTER — HOSPITAL ENCOUNTER (OUTPATIENT)
Dept: RADIOLOGY | Facility: HOSPITAL | Age: 65
Discharge: HOME | End: 2025-01-10
Attending: INTERNAL MEDICINE
Payer: COMMERCIAL

## 2025-01-10 DIAGNOSIS — K76.89 LIVER CYST: ICD-10-CM

## 2025-01-10 PROCEDURE — 76705 ECHO EXAM OF ABDOMEN: CPT | Mod: TC

## 2025-01-10 PROCEDURE — 76705 ECHO EXAM OF ABDOMEN: CPT | Mod: 26,,, | Performed by: INTERNAL MEDICINE

## 2025-01-15 ENCOUNTER — CLINICAL SUPPORT (OUTPATIENT)
Dept: REHABILITATION | Facility: HOSPITAL | Age: 65
End: 2025-01-15
Payer: COMMERCIAL

## 2025-01-15 DIAGNOSIS — I89.0 LYMPHEDEMA: Primary | ICD-10-CM

## 2025-01-15 PROCEDURE — 97016 VASOPNEUMATIC DEVICE THERAPY: CPT

## 2025-01-15 PROCEDURE — 97535 SELF CARE MNGMENT TRAINING: CPT

## 2025-01-15 NOTE — PROGRESS NOTES
[facility]  Occupational Therapy Treatment Note  Lymphedema    Date: 1/15/2025  Name: Kurtis Gee  Clinic Number: [id number]    Therapy Diagnosis:   Encounter Diagnosis   Name Primary?    Lymphedema Yes       Physician: Misty Clark MD    Physician Orders: Eval and Treat  Medical Diagnosis: I89.0 (ICD-10-CM) - Lymphedema   Evaluation Date: 9/25/2024  Insurance Authorization Period Expiration: 9/6/2025  Plan of Care Certification Period: 12/17/2024  Visit # / Visits authorized: 14 / 20  FOTO: see media, 1 / 3     Precautions:  Standard       Time In: 4:30  Time Out: 5:30  Total Billable Time: 60 minutes     SUBJECTIVE     Pt reports: She is using her home pump and wearing the velcro wraps. BLE greatly improved.   KURTIS reported wearing compression outside of therapy visits: Yes -   She was compliant with home exercise program given last session.   Response to previous treatment:no change  Functional change: none    Pain: did not quantify      OBJECTIVE     Pt arrived wearing compression , in NAD.    Compression garment status: owns knee highs and foot/ankle Velcro wraps  Compression Rx status: sent to DME team  Pneumatic pump status: sent to os    LE Girth Measurements: taken in supine  LANDMARK RIGHT LE  9/25/24 LEFT LE  9/25/24 RIGHT LE  1/15/24 LEFT LE  1/15/24   Length- calf 35.0 cm 35.0 cm - -   Length- foot 18.0 cm 18.0 cm - -   Calf 42.5 cm 44.0 cm 41.0 cm 43.0 cm   Ankle 32.5 cm 34.0 cm 32.0 cm 33.5 cm   Forefoot 26.5 cm 28.5 cm 25.0 cm 27.0 cm         Treatment     KURTIS received the treatments listed below:       Manual therapy techniques were applied for 10 minutes, including:    SEQUENTIAL COMPRESSION PUMP:   Full leg sleeve applied to BLE  Airos 6 with distal pressures starting at 45mmHg entire LE     PT DEFERS TODAY:      MULTILAYERED BANDAGING:    Issued supplies and bandaged BLE   Use of Cavilon moisturizer for dry skin  Cotton stockinet: size  9  Cellona padding from dorsum of foot to knee,   2-6cm 2-8cm and 2-10cm Rosidal K rolls foot to distal knee  Elastomull to toes    Pt to leave intact 24-48 hrs as tolerated, discontinue with any problems, return rolled bandages next session. Wash and wear schedules confirmed.     Self-care/home management techniques to improve functional performance for 25 minutes, including:    Patient was educated in   Compression needs: 20-30 mm hg knee highs and inelastic Velcro wrap- foot/ankle  Wear schedule: Joce first thing in the morning, remove at the end of the day as close to bedtime as possible. Do not sleep in garments. If using a home pneumatic pump, remove garments when using pump. If it is not yet bedtime, resume wearing garments until bed.  Wear schedule: Joce garments after bathing/skin-care, remove every 24 hours for short skin break, bathing, and skin hygiene, and then resume wearing. If using a home pneumatic pump, remove garments when using pump and resume wearing after.  Donning/doffing techniques    Practiced donning/doffing inelastic Velcro wrap- foot/ankle garment x  2 repetitions        Patient Education and Home Exercises      Education provided:   -see above  - Progress towards goals        Assessment     BLE greatly improved since wearing velcro wraps and using home pump. Discharge next session.     Pt would continue to benefit from skilled OT.      KURTIS is progressing well towards her goals and there are no updates to goals at this time. Pt prognosis is Good.     Pt will continue to benefit from skilled outpatient occupational therapy to address the deficits listed in the problem list on initial evaluation provide pt/family education and to maximize pt's level of independence in the home and community environment.     Pt's spiritual, cultural and educational needs considered and pt agreeable to plan of care and goals.    Anticipated barriers to occupational therapy: none    Goals:    Short Term  Goals: (3 weeks)  Goals: Progressing / MET   1. Patient will demonstrate 100% knowledge of lymphedema precautions and signs of infection to allow for reduced lymphedema risk, infection risk, and/or exacerbation of condition.  NOT MET   2. Patient will tolerate daily activities wearing multilayered bandaging to allow for lymphatic drainage support, skin elasticity, and reduction in shape and size of limb.  NOT MET   3. Patient and/or caregiver will order/obtain appropriate compression garments to maintain lymphatic and venous support.  NOT MET   4. Patient will show decreased total girth measurement in BLE by up to 2 cm to allow for lower extremity symmetry, shoe and clothing choice, and ability to apply needed compression. NOT MET      Long Term Goals: (6 weeks)  Goals: Progressing / MET   1. Patient and/or caregiver to yari/doff compression garment independently and with daily compliance to assist in lymphedema management, skin elasticity, and tissue density NOT MET   2. Patient and/or caregiver will be independent in use of pneumatic compression pump or self manual lymphatic drainage techniques to areas within reach to enhance lymphatic drainage and skin condition.  NOT MET   3. Patient to be independent and compliant with home exercise program to allow for increased function in affected limb. NOT MET   4. Patient will show decreased total girth measurement in BLE by up to 5 cm  to allow for lower extremity symmetry, shoe and clothing choice, and ability to apply needed compression daily. NOT MET          PLAN     Updates/Grading for next session: none    Erna Borrego, OT, CLWT

## 2025-01-16 DIAGNOSIS — Z78.0 MENOPAUSE: ICD-10-CM

## 2025-01-16 DIAGNOSIS — E11.9 TYPE 2 DIABETES MELLITUS WITHOUT COMPLICATION, UNSPECIFIED WHETHER LONG TERM INSULIN USE: ICD-10-CM

## 2025-01-16 DIAGNOSIS — E11.9 TYPE 2 DIABETES MELLITUS WITHOUT COMPLICATION: ICD-10-CM

## 2025-01-17 NOTE — PROGRESS NOTES
[facility]  Occupational Therapy Plan of Care Note     Name: Marita Gee  Clinic Number: [id number]    Therapy Diagnosis:   Encounter Diagnosis   Name Primary?    Lymphedema Yes     Physician: Misty Clark MD    Visit Date: 1/15/2025     Physician Orders: Eval and Treat  Medical Diagnosis: I89.0 (ICD-10-CM) - Lymphedema   Evaluation Date: 9/25/2024  Insurance Authorization Period Expiration: 9/6/2025  Plan of Care Certification Period: 12/17/2024  Visit # / Visits authorized: 14 / 20  FOTO: see media, 1 / 3     Precautions:  Standard    Subjective     Update: BLE improved. Slight edema persists though pt not using pump daily yet.    Objective      Update: decreased erythema, hyperpigmentation.    LE Girth Measurements: taken in supine  LANDMARK RIGHT LE  9/25/24 LEFT LE  9/25/24 RIGHT LE  1/15/24 LEFT LE  1/15/24   Length- calf 35.0 cm 35.0 cm - -   Length- foot 18.0 cm 18.0 cm - -   Calf 42.5 cm 44.0 cm 41.0 cm 43.0 cm   Ankle 32.5 cm 34.0 cm 32.0 cm 33.5 cm   Forefoot 26.5 cm 28.5 cm 25.0 cm 27.0 cm         Assessment     Update: BLE greatly improved since wearing foot/ankle Velcro wraps daily and beginning to use home pump. Pt to RTC for reassessment after using pump daily.     Previous Short Term Goals Status:   met, progressing  New Short Term Goals Status:   progressing  Long Term Goal Status: continue per initial plan of care.  Reasons for Recertification of Therapy:   goals remain     GOALS  Short Term Goals: (3 weeks)  Goals: Progressing / MET   1. Patient will demonstrate 100% knowledge of lymphedema precautions and signs of infection to allow for reduced lymphedema risk, infection risk, and/or exacerbation of condition.  MET   2. Patient will tolerate daily activities wearing multilayered bandaging to allow for lymphatic drainage support, skin elasticity, and reduction in shape and size of limb.  MET   3. Patient and/or caregiver will order/obtain appropriate  compression garments to maintain lymphatic and venous support.  MET   4. Patient will show decreased total girth measurement in BLE by up to 2 cm to allow for lower extremity symmetry, shoe and clothing choice, and ability to apply needed compression. MET      Long Term Goals: (6 weeks)  Goals: Progressing / MET   1. Patient and/or caregiver to yari/doff compression garment independently and with daily compliance to assist in lymphedema management, skin elasticity, and tissue density MET   2. Patient and/or caregiver will be independent in use of pneumatic compression pump or self manual lymphatic drainage techniques to areas within reach to enhance lymphatic drainage and skin condition.  MET   3. Patient to be independent and compliant with home exercise program to allow for increased function in affected limb. MET   4. Patient will show decreased total girth measurement in BLE by up to 5 cm  to allow for lower extremity symmetry, shoe and clothing choice, and ability to apply needed compression daily. NOT MET           Plan     Updated Certification Period: 12/17/2024 to 4/6/2025   Recommended Treatment Plan: 1 times per week for 3 weeks:  Manual Therapy, Patient Education, Self Care, Therapeutic Activities, and Therapeutic Exercise      Erna Borrego, OT     No

## 2025-02-12 ENCOUNTER — TELEPHONE (OUTPATIENT)
Dept: ENDOSCOPY | Facility: HOSPITAL | Age: 65
End: 2025-02-12

## 2025-02-13 ENCOUNTER — CLINICAL SUPPORT (OUTPATIENT)
Dept: REHABILITATION | Facility: HOSPITAL | Age: 65
End: 2025-02-13
Payer: COMMERCIAL

## 2025-02-13 DIAGNOSIS — I89.0 LYMPHEDEMA: Primary | ICD-10-CM

## 2025-02-13 PROCEDURE — 97535 SELF CARE MNGMENT TRAINING: CPT

## 2025-02-13 PROCEDURE — 97016 VASOPNEUMATIC DEVICE THERAPY: CPT

## 2025-02-13 NOTE — H&P
Short Stay Endoscopy History and Physical    PCP - Misty Clark MD  Referring Physician - Misty Clark MD  [address] [place] [address]    Procedure - Colonoscopy  ASA - per anesthesia  Mallampati - per anesthesia  History of Anesthesia problems - no  Family history Anesthesia problems -  no   Plan of anesthesia - General    HPI  64 y.o. female  Reason for procedure:   Screening for colon cancer [Z12.11]    2014 pandiverticulosis     ROS:  Constitutional: No fevers, chills, No weight loss  CV: No chest pain  Pulm: No cough, No shortness of breath  GI: see HPI    Medical History:  has a past medical history of Breast cyst, History of cellulitis , HTN (hypertension) (8/11/2012), Obesity (9/27/2012), Partial small bowel obstruction (9/2013), Subclavian artery thrombosis (9/2013), Type 2 diabetes mellitus without complication, with long-term current use of insulin (9/6/2024), Venous insufficiency (8/11/2012), and Vitamin D deficiency disease (8/11/2012).    Surgical History:  has a past surgical history that includes Appendectomy (2002); Umbilical hernia repair (2003); Dilation and curettage of uterus (2008); bowel obstruction (2002); Total abdominal hysterectomy w/ bilateral salpingoophorectomy (9/2013); Hysterectomy (09/04/2013); Breast biopsy (Left, 2018); Colonoscopy; and Excisional biopsy (Left, 1/14/2021).    Family History: family history includes Cancer in her paternal uncle; Cataracts in her mother; Hypertension in her mother; No Known Problems in her brother, brother, brother, father, maternal aunt, maternal grandfather, maternal grandmother, maternal uncle, paternal aunt, paternal grandfather, paternal grandmother, and sister..    Social History:  reports that she quit smoking about 23 years ago. Her smoking use included cigarettes. She started smoking about 35 years ago. She has a 6 pack-year smoking history. She has never used smokeless tobacco. She reports current alcohol  use. She reports that she does not use drugs.    Review of patient's allergies indicates:   Allergen Reactions    Bactrim [sulfamethoxazole-trimethoprim]      rash       Medications:   Medications Prior to Admission   Medication Sig Dispense Refill Last Dose/Taking    cholecalciferol, vitamin D3, 125 mcg (5,000 unit) Tab Take 5,000 Units by mouth once daily.       cyclobenzaprine (FLEXERIL) 10 MG tablet Take 10 mg by mouth.       doxycycline (MONODOX) 100 MG capsule Take 1 capsule (100 mg total) by mouth 2 (two) times daily. (Patient not taking: Reported on 1/3/2025) 20 capsule 2     ibuprofen (ADVIL,MOTRIN) 800 MG tablet Take 800 mg by mouth every 8 (eight) hours as needed.       L.acidoph/B.long/L.plant/B.lac (PROBIOTIC ACIDOPHILUS BEADS ORAL) Take by mouth.       multivitamin capsule Take 1 capsule by mouth once daily.       semaglutide (OZEMPIC) 0.25 mg or 0.5 mg (2 mg/3 mL) pen injector Inject 0.5 mg into the skin every 7 days. 3 mL 1     spironolactone (ALDACTONE) 25 MG tablet TAKE 1 TO 2 TABLETS BY MOUTH DAILY FOR BLOOD PRESSURE 180 tablet 2     tretinoin (ALTRENO) 0.05 % Lotn Apply once daily (Patient not taking: Reported on 1/3/2025) 20 g 3     valsartan-hydrochlorothiazide (DIOVAN-HCT) 320-25 mg per tablet Take 1 tablet by mouth once daily. 90 tablet 3        Physical Exam:    Vital Signs: There were no vitals filed for this visit.    General Appearance: Well appearing in no acute distress  Abdomen: Soft, non tender, non distended with normal bowel sounds, no masses    Labs:  Lab Results   Component Value Date    WBC 7.76 12/23/2024    HGB 13.6 12/23/2024    HCT 40.1 12/23/2024     12/23/2024    CHOL 164 12/23/2024    TRIG 52 12/23/2024    HDL 39 (L) 12/23/2024    ALT 25 12/23/2024    AST 29 12/23/2024     12/23/2024    K 4.3 12/23/2024     12/23/2024    CREATININE 0.9 12/23/2024    BUN 15 12/23/2024    CO2 29 12/23/2024    TSH 0.951 08/31/2024    INR 1.0 04/13/2009    HGBA1C 5.7 (H)  12/23/2024       I have explained the risks and benefits of this endoscopic procedure to the patient including but not limited to bleeding, inflammation, infection, perforation, and death.    Assessment/Plan:     CRC Screening     - Proceed with colonoscopy     Abby Keene MD  Gastroenterology   [facility]

## 2025-02-13 NOTE — PROGRESS NOTES
[facility]  Occupational Therapy Discharge Note  Lymphedema    Date: 2/13/2025  Name: Kurtis Gee  Clinic Number: [id number]    Therapy Diagnosis:   Encounter Diagnosis   Name Primary?    Lymphedema Yes         Physician: Misty Clark MD    Physician Orders: Eval and Treat  Medical Diagnosis: I89.0 (ICD-10-CM) - Lymphedema   Evaluation Date: 9/25/2024  Insurance Authorization Period Expiration: 9/6/2025  Plan of Care Certification Period: 12/17/2024  Visit # / Visits authorized: 14 / 20  FOTO: see media, 1 / 3     Precautions:  Standard       Time In: 5:40  Time Out: 6:30  Total Billable Time: 50 minutes     SUBJECTIVE     Pt reports: She is using her home pump 3-5 times per week. Wearing compression stockings daily but foot/ankle Velcro wraps only at night and weekends. She was not aware she was supposed to wear the Velcro wraps daily in addition to the stockings.   KURTIS reported wearing compression outside of therapy visits: Yes -   She was compliant with home exercise program given last session.   Response to previous treatment:no change  Functional change: none    Pain: did not quantify      OBJECTIVE     Pt arrived wearing compression , in NAD.    Compression garment status: owns knee highs and foot/ankle Velcro wraps  Compression Rx status: sent to DME team  Pneumatic pump status: sent to [facility] Girth Measurements: taken in supine  LANDMARK RIGHT LE  9/25/24 LEFT LE  9/25/24 RIGHT LE  1/15/24 LEFT LE  1/15/24 RIGHT LE  2/13/25 LEFT LE  2/13/25   Length- calf 35.0 cm 35.0 cm - - - -   Length- foot 18.0 cm 18.0 cm - - - -   Calf 42.5 cm 44.0 cm 41.0 cm 43.0 cm 43.0 cm 43.5 cm   Ankle 32.5 cm 34.0 cm 32.0 cm 33.5 cm 32.5 cm 35.0 cm   Forefoot 26.5 cm 28.5 cm 25.0 cm 27.0 cm 25.0 cm 27.5 cm         Treatment     KURTIS received the treatments listed below:       SEQUENTIAL COMPRESSION PUMP:   Full leg sleeve applied to BLE  Airos 6 with distal pressures starting  at 45mmHg entire LE     Self-care/home management techniques to improve functional performance for 40 minutes, including:    Patient was educated in   Compression needs: 20-30 mm hg knee highs and inelastic Velcro wrap- foot/ankle  Wear schedule: Joce first thing in the morning, remove at the end of the day as close to bedtime as possible. Do not sleep in garments. If using a home pneumatic pump, remove garments when using pump. If it is not yet bedtime, resume wearing garments until bed.  Wear schedule: Joce garments after bathing/skin-care, remove every 24 hours for short skin break, bathing, and skin hygiene, and then resume wearing. If using a home pneumatic pump, remove garments when using pump and resume wearing after.  Donning/doffing techniques  Wash and management of products  Cost/coverage of compression garments: Medicare now pays for compression. Private insurance coverage is on a case-by-case basis. In order to determine coverage an Rx with a diagnosis of lymphedema is sent to a participating DME for a benefits check.   Medicare Coverage: Daytime garments - 3 sets (one garment for each affected body part) every six months,  standard or custom fit, or a combination of both. Nighttime garments - 2 sets (one garment for each affected body part) every two years, standard or custom fit, or a combination of both. Bandaging supplies - no set limit in the rule. Accessories - no set limit, will be determined on a case-by-case basis depending on the needs of the patient. Coverage is under Medicare part B. After deductible is met, a 20% copayment is required for all items. Secondary insurance often covers copayment's.   Pneumatic pump benefits, set-up, use, referral process, coverage through insurance. Coverage is based on insurance plan as well as if deductible has been met. Pneumatic pump is an added tool for managing lymphedema at home and is meant to be used in addition to wearing compression garments daily  but does not substitute compression garments.   Seek new referral if edema status worsens or changes in the future despite compliance with home management techniques.   Monitor for signs/symptoms of infection and seek medical attention immediately if symptoms occur.  Do not need to return to therapy for new compression garments. PCP/referring provider can re-order compression garment Rx. Rx must include i89.0 lymphedema dx code. DME requires a face-to-face provider appointment within 6 months with i89.0 lymphedema dx code and medical justification statement for compression garments.    Practiced donning/doffing inelastic Velcro wrap- foot/ankle garment x  2 repetitions        Patient Education and Home Exercises      Education provided:   -see above  - Progress towards goals        Assessment     BLE have reached a plateau as per updated measurements. Reinforced necessity of wearing foot/ankle Velcro wraps daily in addition to stockings for maximum containment. All questions answered. Pt is ready for discharge to home management.       Anticipated barriers to occupational therapy: none    Goals:    Short Term Goals: (3 weeks)  Goals: Progressing / MET   1. Patient will demonstrate 100% knowledge of lymphedema precautions and signs of infection to allow for reduced lymphedema risk, infection risk, and/or exacerbation of condition.  MET   2. Patient will tolerate daily activities wearing multilayered bandaging to allow for lymphatic drainage support, skin elasticity, and reduction in shape and size of limb.  MET   3. Patient and/or caregiver will order/obtain appropriate compression garments to maintain lymphatic and venous support.  MET   4. Patient will show decreased total girth measurement in BLE by up to 2 cm to allow for lower extremity symmetry, shoe and clothing choice, and ability to apply needed compression. NOT MET      Long Term Goals: (6 weeks)  Goals: Progressing / MET   1. Patient and/or caregiver to  yari/doff compression garment independently and with daily compliance to assist in lymphedema management, skin elasticity, and tissue density MET   2. Patient and/or caregiver will be independent in use of pneumatic compression pump or self manual lymphatic drainage techniques to areas within reach to enhance lymphatic drainage and skin condition.  MET   3. Patient to be independent and compliant with home exercise program to allow for increased function in affected limb. MET   4. Patient will show decreased total girth measurement in BLE by up to 5 cm  to allow for lower extremity symmetry, shoe and clothing choice, and ability to apply needed compression daily. NOT MET          PLAN     Updates/Grading for next session: none    Erna Borrego, OT, CLWT

## 2025-02-17 ENCOUNTER — HOSPITAL ENCOUNTER (OUTPATIENT)
Facility: HOSPITAL | Age: 65
Discharge: HOME | End: 2025-02-17
Attending: STUDENT IN AN ORGANIZED HEALTH CARE EDUCATION/TRAINING PROGRAM | Admitting: STUDENT IN AN ORGANIZED HEALTH CARE EDUCATION/TRAINING PROGRAM
Payer: COMMERCIAL

## 2025-02-17 VITALS
HEIGHT: 66 IN | BODY MASS INDEX: 39.37 KG/M2 | OXYGEN SATURATION: 99 % | WEIGHT: 245 LBS | DIASTOLIC BLOOD PRESSURE: 69 MMHG | TEMPERATURE: 98 F | SYSTOLIC BLOOD PRESSURE: 134 MMHG | HEART RATE: 75 BPM | RESPIRATION RATE: 20 BRPM

## 2025-02-17 DIAGNOSIS — Z12.11 COLON CANCER SCREENING: Primary | ICD-10-CM

## 2025-02-17 LAB — POCT GLUCOSE: 111 MG/DL (ref 70–110)

## 2025-02-17 PROCEDURE — 25000003 PHARM REV CODE 250: Performed by: REGISTERED NURSE

## 2025-02-17 PROCEDURE — 37000008 HC ANESTHESIA 1ST 15 MINUTES: Performed by: STUDENT IN AN ORGANIZED HEALTH CARE EDUCATION/TRAINING PROGRAM

## 2025-02-17 PROCEDURE — 82962 GLUCOSE BLOOD TEST: CPT | Performed by: STUDENT IN AN ORGANIZED HEALTH CARE EDUCATION/TRAINING PROGRAM

## 2025-02-17 PROCEDURE — 37000009 HC ANESTHESIA EA ADD 15 MINS: Performed by: STUDENT IN AN ORGANIZED HEALTH CARE EDUCATION/TRAINING PROGRAM

## 2025-02-17 PROCEDURE — 88305 TISSUE EXAM BY PATHOLOGIST: CPT | Performed by: STUDENT IN AN ORGANIZED HEALTH CARE EDUCATION/TRAINING PROGRAM

## 2025-02-17 PROCEDURE — 63600175 PHARM REV CODE 636 W HCPCS: Performed by: REGISTERED NURSE

## 2025-02-17 PROCEDURE — 45385 COLONOSCOPY W/LESION REMOVAL: CPT | Mod: 33 | Performed by: STUDENT IN AN ORGANIZED HEALTH CARE EDUCATION/TRAINING PROGRAM

## 2025-02-17 PROCEDURE — 94761 N-INVAS EAR/PLS OXIMETRY MLT: CPT

## 2025-02-17 PROCEDURE — 27200997: Performed by: STUDENT IN AN ORGANIZED HEALTH CARE EDUCATION/TRAINING PROGRAM

## 2025-02-17 PROCEDURE — 27201089 HC SNARE, DISP (ANY): Performed by: STUDENT IN AN ORGANIZED HEALTH CARE EDUCATION/TRAINING PROGRAM

## 2025-02-17 PROCEDURE — 99900035 HC TECH TIME PER 15 MIN (STAT)

## 2025-02-17 PROCEDURE — 45380 COLONOSCOPY AND BIOPSY: CPT | Mod: 33,59 | Performed by: STUDENT IN AN ORGANIZED HEALTH CARE EDUCATION/TRAINING PROGRAM

## 2025-02-17 PROCEDURE — 27201012 HC FORCEPS, HOT/COLD, DISP: Performed by: STUDENT IN AN ORGANIZED HEALTH CARE EDUCATION/TRAINING PROGRAM

## 2025-02-17 RX ORDER — PROPOFOL 10 MG/ML
VIAL (ML) INTRAVENOUS CONTINUOUS PRN
Status: DISCONTINUED | OUTPATIENT
Start: 2025-02-17 | End: 2025-02-17

## 2025-02-17 RX ORDER — PROPOFOL 10 MG/ML
VIAL (ML) INTRAVENOUS
Status: DISCONTINUED | OUTPATIENT
Start: 2025-02-17 | End: 2025-02-17

## 2025-02-17 RX ORDER — SODIUM CHLORIDE 9 MG/ML
INJECTION, SOLUTION INTRAVENOUS CONTINUOUS
Status: DISCONTINUED | OUTPATIENT
Start: 2025-02-17 | End: 2025-02-17 | Stop reason: HOSPADM

## 2025-02-17 RX ORDER — LIDOCAINE HYDROCHLORIDE 20 MG/ML
INJECTION INTRAVENOUS
Status: DISCONTINUED | OUTPATIENT
Start: 2025-02-17 | End: 2025-02-17

## 2025-02-17 RX ADMIN — PROPOFOL 30 MG: 10 INJECTION, EMULSION INTRAVENOUS at 10:02

## 2025-02-17 RX ADMIN — PROPOFOL 150 MCG/KG/MIN: 10 INJECTION, EMULSION INTRAVENOUS at 10:02

## 2025-02-17 RX ADMIN — SODIUM CHLORIDE: 0.9 INJECTION, SOLUTION INTRAVENOUS at 10:02

## 2025-02-17 RX ADMIN — LIDOCAINE HYDROCHLORIDE 75 MG: 20 INJECTION INTRAVENOUS at 10:02

## 2025-02-17 RX ADMIN — PROPOFOL 70 MG: 10 INJECTION, EMULSION INTRAVENOUS at 10:02

## 2025-02-17 NOTE — TRANSFER OF CARE
"Anesthesia Transfer of Care Note    Patient: Marita Gee    Procedure(s) Performed: Procedure(s) (LRB):  COLONOSCOPY, SCREENING, LOW RISK PATIENT (N/A)    Patient location: PACU    Anesthesia Type: general    Transport from OR: Transported from OR on room air with adequate spontaneous ventilation    Post pain: adequate analgesia    Post assessment: no apparent anesthetic complications and tolerated procedure well    Post vital signs: stable    Level of consciousness: awake, alert and oriented    Nausea/Vomiting: no nausea/vomiting    Complications: none    Transfer of care protocol was followed      Last vitals: Visit Vitals  BP (!) 147/68 (BP Location: Left arm, Patient Position: Lying)   Pulse 65   Temp 36.4 °C (97.5 °F) (Tympanic)   Resp 16   Ht 5' 6" (1.676 m)   Wt 111.1 kg (245 lb)   LMP 08/19/2013   SpO2 100%   Breastfeeding No   BMI 39.54 kg/m²     " Tremfya Counseling: I discussed with the patient the risks of guselkumab including but not limited to immunosuppression, serious infections, worsening of inflammatory bowel disease and drug reactions.  The patient understands that monitoring is required including a PPD at baseline and must alert us or the primary physician if symptoms of infection or other concerning signs are noted.

## 2025-02-17 NOTE — PROVATION PATIENT INSTRUCTIONS
Discharge Summary/Instructions after an Endoscopic Procedure  Patient Name: Marita Gee  Patient MRN: [id number]  Patient YOB: 1960 Monday, February 17, 2025  Abby Keene MD  Dear patient,  As a result of recent federal legislation (The Federal Cures Act), you may   receive lab or pathology results from your procedure in your [facility]   account before your physician is able to contact you. Your physician or   their representative will relay the results to you with their   recommendations at their soonest availability.  Thank you,  RESTRICTIONS:  During your procedure today, you received medications for sedation.  These   medications may affect your judgment, balance and coordination.  Therefore,   for 24 hours, you have the following restrictions:   - DO NOT drive a car, operate machinery, make legal/financial decisions,   sign important papers or drink alcohol.    ACTIVITY:  Today: no heavy lifting, straining or running due to procedural   sedation/anesthesia.  The following day: return to full activity including work.  DIET:  Eat and drink normally unless instructed otherwise.     TREATMENT FOR COMMON SIDE EFFECTS:  - Mild abdominal pain, nausea, belching, bloating or excessive gas:  rest,   eat lightly and use a heating pad.  - Sore Throat: treat with throat lozenges and/or gargle with warm salt   water.  - Because air was used during the procedure, expelling large amounts of air   from your rectum or belching is normal.  - If a bowel prep was taken, you may not have a bowel movement for 1-3 days.    This is normal.  SYMPTOMS TO WATCH FOR AND REPORT TO YOUR PHYSICIAN:  1. Abdominal pain or bloating, other than gas cramps.  2. Chest pain.  3. Back pain.  4. Signs of infection such as: chills or fever occurring within 24 hours   after the procedure.  5. Rectal bleeding, which would show as bright red, maroon, or black stools.   (A tablespoon of blood from the rectum is not serious, especially  if   hemorrhoids are present.)  6. Vomiting.  7. Weakness or dizziness.  GO DIRECTLY TO THE NEAREST EMERGENCY ROOM IF YOU HAVE ANY OF THE FOLLOWING:      Difficulty breathing              Chills and/or fever over 101 F   Persistent vomiting and/or vomiting blood   Severe abdominal pain   Severe chest pain   Black, tarry stools   Bleeding- more than one tablespoon   Any other symptom or condition that you feel may need urgent attention  Your doctor recommends these additional instructions:  If any biopsies were taken, your doctors clinic will contact you in 1 to 2   weeks with any results.  - Discharge patient to home (ambulatory).   - Resume regular diet.   - Continue present medications.   - Await pathology results.   - Repeat colonoscopy in 3 years for surveillance.  For questions, problems or results please call your physician - Abby Keene MD at Work:  [phone].  [facility] [place], EMERGENCY ROOM PHONE NUMBER: [phone]  IF A COMPLICATION OR EMERGENCY SITUATION ARISES AND YOU ARE UNABLE TO REACH   YOUR PHYSICIAN - GO DIRECTLY TO THE EMERGENCY ROOM.  Abby Keene MD  2/17/2025 11:25:13 AM  This report has been verified and signed electronically.  Dear patient,  As a result of recent federal legislation (The Federal Cures Act), you may   receive lab or pathology results from your procedure in your [facility]   account before your physician is able to contact you. Your physician or   their representative will relay the results to you with their   recommendations at their soonest availability.  Thank you,  PROVATION

## 2025-02-17 NOTE — ANESTHESIA POSTPROCEDURE EVALUATION
Anesthesia Post Evaluation    Patient: Marita Gee    Procedure(s) Performed: Procedure(s) (LRB):  COLONOSCOPY, SCREENING, LOW RISK PATIENT (N/A)    Final Anesthesia Type: general      Patient location during evaluation: PACU  Patient participation: Yes- Able to Participate  Level of consciousness: awake and alert  Post-procedure vital signs: reviewed and stable  Pain management: adequate  Airway patency: patent    PONV status at discharge: No PONV  Anesthetic complications: no      Cardiovascular status: blood pressure returned to baseline  Respiratory status: unassisted  Hydration status: euvolemic            Vitals Value Taken Time   /69 02/17/25 11:46   Temp 36.4 °C (97.5 °F) 02/17/25 11:26   Pulse 76 02/17/25 11:47   Resp 22 02/17/25 11:47   SpO2 99 % 02/17/25 11:47   Vitals shown include unfiled device data.      No case tracking events are documented in the log.      Pain/Saeed Score: Saeed Score: 10 (2/17/2025 11:39 AM)

## 2025-02-20 ENCOUNTER — RESULTS FOLLOW-UP (OUTPATIENT)
Dept: GASTROENTEROLOGY | Facility: CLINIC | Age: 65
End: 2025-02-20
Payer: COMMERCIAL

## 2025-02-20 LAB
FINAL PATHOLOGIC DIAGNOSIS: NORMAL
GROSS: NORMAL
Lab: NORMAL

## 2025-02-24 ENCOUNTER — PATIENT MESSAGE (OUTPATIENT)
Dept: GASTROENTEROLOGY | Facility: CLINIC | Age: 65
End: 2025-02-24
Payer: COMMERCIAL

## 2025-03-22 ENCOUNTER — TELEPHONE (OUTPATIENT)
Dept: PHARMACY | Facility: CLINIC | Age: 65
End: 2025-03-22
Payer: COMMERCIAL

## 2025-03-22 NOTE — TELEPHONE ENCOUNTER
[facility]/Population Health Chart Review & Patient Outreach Details For Medication Adherence Project    Reason for Outreach Encounter: 3rd Party payor non-compliance report ([employer], BCBS, C, etc)  2.  Patient Outreach Method: Goomzeehart message  3.   Medication in question:    Hypertension Medications              spironolactone (ALDACTONE) 25 MG tablet TAKE 1 TO 2 TABLETS BY MOUTH DAILY FOR BLOOD PRESSURE    valsartan-hydrochlorothiazide (DIOVAN-HCT) 320-25 mg per tablet Take 1 tablet by mouth once daily.                 valsartan-hydrochlorothiazide  last filled  12/22/24 for 90 day supply      4.  Reviewed and or Updates Made To: Patient Chart  5. Outreach Outcomes and/or actions taken: Sent inquiry to patient: Waiting for response and Patient reminded to  prescription  Additional Notes:

## 2025-04-20 DIAGNOSIS — E11.9 TYPE 2 DIABETES MELLITUS WITHOUT COMPLICATION, WITHOUT LONG-TERM CURRENT USE OF INSULIN: ICD-10-CM

## 2025-04-21 RX ORDER — SEMAGLUTIDE 0.68 MG/ML
0.5 INJECTION, SOLUTION SUBCUTANEOUS
Qty: 9 ML | Refills: 0 | Status: SHIPPED | OUTPATIENT
Start: 2025-04-21

## 2025-04-21 NOTE — TELEPHONE ENCOUNTER
Care Due:                  Date            Visit Type   Department     Provider  --------------------------------------------------------------------------------                                EP -                              PRIMARY      [facility] INTERNAL  Last Visit: 01-      CARE ([facility])   RANDEE Clark                              EP -                              PRIMARY      [facility] INTERNAL  Next Visit: 06-      CARE ([facility])   MEDICINE       Misty Clark                                                            Last  Test          Frequency    Reason                     Performed    Due Date  --------------------------------------------------------------------------------    HBA1C.......  6 months...  semaglutide..............  12- 06-    [employer] Embedded Care Due Messages. Reference number: [id number].   4/20/2025 11:30:16 PM CDT

## 2025-04-21 NOTE — TELEPHONE ENCOUNTER
Refill Decision Note   Marita Gee  is requesting a refill authorization.    Brief Assessment and Rationale for Refill:  Approve       Medication Therapy Plan:  FLOS      Comments:     Note composed:5:49 PM 04/21/2025

## 2025-04-24 ENCOUNTER — TELEPHONE (OUTPATIENT)
Dept: PHARMACY | Facility: CLINIC | Age: 65
End: 2025-04-24
Payer: COMMERCIAL

## 2025-04-24 NOTE — TELEPHONE ENCOUNTER
[facility]/Population Health Chart Review & Patient Outreach Details For Medication Adherence Project    Reason for Outreach Encounter: 3rd Party payor non-compliance report ([employer], BCBS, C, etc)  2.  Patient Outreach Method: Reviewed Patient Chart  3.   Medication in question: diovan-hctz   LAST FILLED: 4/9/25 for 90 day supply  Hypertension Medications              spironolactone (ALDACTONE) 25 MG tablet TAKE 1 TO 2 TABLETS BY MOUTH DAILY FOR BLOOD PRESSURE    valsartan-hydrochlorothiazide (DIOVAN-HCT) 320-25 mg per tablet Take 1 tablet by mouth once daily.              4.  Reviewed and or Updates Made To: Patient Chart  5. Outreach Outcomes and/or actions taken: Patient filled medication and is on track to be adherent

## 2025-05-29 ENCOUNTER — PATIENT OUTREACH (OUTPATIENT)
Dept: ADMINISTRATIVE | Facility: HOSPITAL | Age: 65
End: 2025-05-29
Payer: COMMERCIAL

## 2025-05-29 NOTE — LETTER
AUTHORIZATION FOR RELEASE OF   CONFIDENTIAL INFORMATION    Dear Dr.Bruce Yates ,    We are seeing Marita Gee, date of birth 1960, in the clinic at [facility] INTERNAL MEDICINE. Misty Clark MD is the patient's PCP. Marita Gee has an outstanding lab/procedure at the time we reviewed her chart. In order to help keep her health information updated, she has authorized us to request the following medical record(s):        (  )  MAMMOGRAM                                      (  )  COLONOSCOPY      (  )  PAP SMEAR                                          (  )  OUTSIDE LAB RESULTS     (  )  DEXA SCAN                                          (  xx)  EYE EXAM            (  )  FOOT EXAM                                          (  )  ENTIRE RECORD     (  )  OUTSIDE IMMUNIZATIONS                 (  )  _______________         Please fax records to [facility], Fernandez, Sara E., MD,  at [phone] or email to [email or web address].       If you have any questions, please contact [facility] at [phone].           Patient Name: Marita Gee  : 1960  Patient Phone #: [phone]

## 2025-05-29 NOTE — PROGRESS NOTES
Chart reviewed and updated. Reconciled immunizations. Requested eye exam records. Linked labs   Brandie Castano LPN   Clinical Care Coordinator  Primary Care and Wellness

## 2025-06-02 ENCOUNTER — HOSPITAL ENCOUNTER (OUTPATIENT)
Dept: RADIOLOGY | Facility: HOSPITAL | Age: 65
Discharge: HOME | End: 2025-06-02
Attending: INTERNAL MEDICINE
Payer: COMMERCIAL

## 2025-06-02 DIAGNOSIS — Z78.0 MENOPAUSE: ICD-10-CM

## 2025-06-02 PROCEDURE — 77080 DXA BONE DENSITY AXIAL: CPT | Mod: TC

## 2025-06-10 ENCOUNTER — RESULTS FOLLOW-UP (OUTPATIENT)
Dept: INTERNAL MEDICINE | Facility: CLINIC | Age: 65
End: 2025-06-10

## 2025-06-21 ENCOUNTER — LAB VISIT (OUTPATIENT)
Dept: LAB | Facility: HOSPITAL | Age: 65
End: 2025-06-21
Attending: INTERNAL MEDICINE
Payer: COMMERCIAL

## 2025-06-21 DIAGNOSIS — E11.9 TYPE 2 DIABETES MELLITUS WITHOUT COMPLICATION, WITHOUT LONG-TERM CURRENT USE OF INSULIN: ICD-10-CM

## 2025-06-21 LAB
ABSOLUTE EOSINOPHIL (OHS): 0.02 K/UL
ABSOLUTE MONOCYTE (OHS): 0.7 K/UL (ref 0.3–1)
ABSOLUTE NEUTROPHIL COUNT (OHS): 5.58 K/UL (ref 1.8–7.7)
ALBUMIN SERPL BCP-MCNC: 3.9 G/DL (ref 3.5–5.2)
ALP SERPL-CCNC: 98 UNIT/L (ref 40–150)
ALT SERPL W/O P-5'-P-CCNC: 29 UNIT/L (ref 10–44)
ANION GAP (OHS): 11 MMOL/L (ref 8–16)
AST SERPL-CCNC: 19 UNIT/L (ref 11–45)
BASOPHILS # BLD AUTO: 0.03 K/UL
BASOPHILS NFR BLD AUTO: 0.3 %
BILIRUB SERPL-MCNC: 0.5 MG/DL (ref 0.1–1)
BUN SERPL-MCNC: 25 MG/DL (ref 8–23)
CALCIUM SERPL-MCNC: 9.1 MG/DL (ref 8.7–10.5)
CHLORIDE SERPL-SCNC: 104 MMOL/L (ref 95–110)
CHOLEST SERPL-MCNC: 171 MG/DL (ref 120–199)
CHOLEST/HDLC SERPL: 3.7 {RATIO} (ref 2–5)
CO2 SERPL-SCNC: 27 MMOL/L (ref 23–29)
CREAT SERPL-MCNC: 1 MG/DL (ref 0.5–1.4)
EAG (OHS): 120 MG/DL (ref 68–131)
ERYTHROCYTE [DISTWIDTH] IN BLOOD BY AUTOMATED COUNT: 12.5 % (ref 11.5–14.5)
GFR SERPLBLD CREATININE-BSD FMLA CKD-EPI: >60 ML/MIN/1.73/M2
GLUCOSE SERPL-MCNC: 126 MG/DL (ref 70–110)
HBA1C MFR BLD: 5.8 % (ref 4–5.6)
HCT VFR BLD AUTO: 40.4 % (ref 37–48.5)
HDLC SERPL-MCNC: 46 MG/DL (ref 40–75)
HDLC SERPL: 26.9 % (ref 20–50)
HGB BLD-MCNC: 13.7 GM/DL (ref 12–16)
IMM GRANULOCYTES # BLD AUTO: 0.03 K/UL (ref 0–0.04)
IMM GRANULOCYTES NFR BLD AUTO: 0.3 % (ref 0–0.5)
LDLC SERPL CALC-MCNC: 108.4 MG/DL (ref 63–159)
LYMPHOCYTES # BLD AUTO: 2.91 K/UL (ref 1–4.8)
MCH RBC QN AUTO: 32.1 PG (ref 27–31)
MCHC RBC AUTO-ENTMCNC: 33.9 G/DL (ref 32–36)
MCV RBC AUTO: 95 FL (ref 82–98)
NONHDLC SERPL-MCNC: 125 MG/DL
NUCLEATED RBC (/100WBC) (OHS): 0 /100 WBC
PLATELET # BLD AUTO: 233 K/UL (ref 150–450)
PMV BLD AUTO: 10 FL (ref 9.2–12.9)
POTASSIUM SERPL-SCNC: 4.6 MMOL/L (ref 3.5–5.1)
PROT SERPL-MCNC: 7.4 GM/DL (ref 6–8.4)
RBC # BLD AUTO: 4.27 M/UL (ref 4–5.4)
RELATIVE EOSINOPHIL (OHS): 0.2 %
RELATIVE LYMPHOCYTE (OHS): 31.4 % (ref 18–48)
RELATIVE MONOCYTE (OHS): 7.6 % (ref 4–15)
RELATIVE NEUTROPHIL (OHS): 60.2 % (ref 38–73)
SODIUM SERPL-SCNC: 142 MMOL/L (ref 136–145)
TRIGL SERPL-MCNC: 83 MG/DL (ref 30–150)
TSH SERPL-ACNC: 1.52 UIU/ML (ref 0.4–4)
WBC # BLD AUTO: 9.27 K/UL (ref 3.9–12.7)

## 2025-06-21 PROCEDURE — 83036 HEMOGLOBIN GLYCOSYLATED A1C: CPT

## 2025-06-21 PROCEDURE — 84443 ASSAY THYROID STIM HORMONE: CPT

## 2025-06-21 PROCEDURE — 80053 COMPREHEN METABOLIC PANEL: CPT

## 2025-06-21 PROCEDURE — 36415 COLL VENOUS BLD VENIPUNCTURE: CPT

## 2025-06-21 PROCEDURE — 80061 LIPID PANEL: CPT

## 2025-06-21 PROCEDURE — 85025 COMPLETE CBC W/AUTO DIFF WBC: CPT

## 2025-06-26 ENCOUNTER — TELEPHONE (OUTPATIENT)
Dept: INTERNAL MEDICINE | Facility: CLINIC | Age: 65
End: 2025-06-26
Payer: COMMERCIAL

## 2025-06-27 ENCOUNTER — OFFICE VISIT (OUTPATIENT)
Dept: INTERNAL MEDICINE | Facility: CLINIC | Age: 65
End: 2025-06-27
Payer: COMMERCIAL

## 2025-06-27 VITALS
BODY MASS INDEX: 39.4 KG/M2 | WEIGHT: 245.13 LBS | SYSTOLIC BLOOD PRESSURE: 128 MMHG | DIASTOLIC BLOOD PRESSURE: 68 MMHG | OXYGEN SATURATION: 97 % | HEART RATE: 77 BPM | HEIGHT: 66 IN

## 2025-06-27 DIAGNOSIS — E66.01 CLASS 2 SEVERE OBESITY DUE TO EXCESS CALORIES WITH SERIOUS COMORBIDITY AND BODY MASS INDEX (BMI) OF 39.0 TO 39.9 IN ADULT: ICD-10-CM

## 2025-06-27 DIAGNOSIS — I10 PRIMARY HYPERTENSION: ICD-10-CM

## 2025-06-27 DIAGNOSIS — Z13.6 ENCOUNTER FOR SCREENING FOR CARDIOVASCULAR DISORDERS: ICD-10-CM

## 2025-06-27 DIAGNOSIS — E66.812 CLASS 2 SEVERE OBESITY DUE TO EXCESS CALORIES WITH SERIOUS COMORBIDITY AND BODY MASS INDEX (BMI) OF 39.0 TO 39.9 IN ADULT: ICD-10-CM

## 2025-06-27 DIAGNOSIS — E11.9 TYPE 2 DIABETES MELLITUS WITHOUT COMPLICATION, WITHOUT LONG-TERM CURRENT USE OF INSULIN: Primary | ICD-10-CM

## 2025-06-27 DIAGNOSIS — L70.9 ACNE, UNSPECIFIED ACNE TYPE: ICD-10-CM

## 2025-06-27 DIAGNOSIS — E66.01 SEVERE OBESITY (BMI 35.0-35.9 WITH COMORBIDITY): ICD-10-CM

## 2025-06-27 DIAGNOSIS — Z12.31 SCREENING MAMMOGRAM FOR BREAST CANCER: ICD-10-CM

## 2025-06-27 PROCEDURE — 99999 PR PBB SHADOW E&M-EST. PATIENT-LVL III: CPT | Mod: PBBFAC,,, | Performed by: INTERNAL MEDICINE

## 2025-06-27 RX ORDER — TRETINOIN 0.5 MG/G
LOTION TOPICAL
Qty: 20 G | Refills: 3 | Status: SHIPPED | OUTPATIENT
Start: 2025-06-27

## 2025-06-27 RX ORDER — SEMAGLUTIDE 1.34 MG/ML
1 INJECTION, SOLUTION SUBCUTANEOUS
Qty: 3 ML | Refills: 11 | Status: SHIPPED | OUTPATIENT
Start: 2025-06-27 | End: 2026-06-27

## 2025-06-27 NOTE — PROGRESS NOTES
CHIEF COMPLAINT: Follow up of multiple issues    HISTORY OF PRESENT ILLNESS: This is a 65-year-old woman who presents for above.     She got the lymphedema pump at the end of November 2024.  She started using the machine for 1-2 hour daily which has helped the swelling in the legs.  She has not been wearing the compression stockings in the summer due to the heat. Less infections in the skin. She has occasional irritation and redness in the lower legs and she starts on doxycycline right away takes care of the problem. No cellulitis recently.     She had an epidural steroid injection through [facility] injection on 6/18/25 - her back pain and right leg pain is better.  She saw Dr Robert pitts on 6/5/25 in pain management.         She had a MI/BSO 9/4/13 which was complicated by a partial small bowel obstruction and left subclavian DVT. She completed her 3 months of Xarelto. Ultrasound of left upper extremity was fine 11/2013. NO left arm pain or swelling.       She continues to take valsartan hct 320/25 once daily and spironolactone 25 mg 2 tablets daily for hypertension. NO chest pain or shortness of breath. She is taking vitamin D supplement 2000 units daily for her vitamin D deficiency.     She has been taking Ozempic 0.5 mg once a week for her diabetes. No nausea, vomiting, constipation, diarrhea. She has lost 6 pounds in 6 months.     She had an angiolipoma 1/14/2021 from the left breast         Mother and brother have rheumatoid arthritis- her joints feel fine. Occasional knee pain.         PAST MEDICAL HISTORY:   1. Hypertension  2. History of cellulitis of the right lower extremity June 2008.   3. Venous insufficiency   4. Mild vitamin D deficiency.   5. Obesity.    6. Left subclavian DVT 9/2013 - postoperatively    7. Partial small bowel obstruction after MI/BSO 9/4/13    PAST SURGICAL HISTORY:   1. Appendectomy in February 2002 with complication of what sounds like small bowel obstruction.   2. Umbilical  "hernia repair in .   3. D&C 2008 secondary to irregular menstrual periods.    4. MI/BSO and small bowel resection with anastamosis 2013    SOCIAL HISTORY: She quit smoking in , smoked less than a pack of cigarettes daily for 8 years, drinks alcohol once 1-2 times a month. Single, no children. She is an . Now  of [employer].     FAMILY HISTORY: Mother is living with rheumatoid arthritis. Father  of heart problems. She has a sister and three brothers who are healthy. One brother was recently diagnosed with FSGS. Brother  of complications of drugs     REVIEW OF SYSTEMS: She denies fevers, chills, night sweats, fatigue, visual change, hearing loss, sinus congestion, sore throat, chest pain, shortness of breath, nausea, vomiting, constipation, diarrhea, dysuria, hematuria, polydipsia, polyuria, joint pain, muscle pain, headaches, anxiety, depression, insomnia.       PHYSICAL EXAMINATION:           /68 (BP Location: Left arm, Patient Position: Sitting)   Pulse 77   Ht 5' 6" (1.676 m)   Wt 111.2 kg (245 lb 2.4 oz)   LMP 2013   SpO2 97%   BMI 39.57 kg/m²     General: Alert, oriented. No apparent distress. Affect within normal limits.   Conjunctivae anicteric. Tympanic membranes clear. Oropharynx clear.   Neck supple. No cervical lymphadenopathy, no thyroid enlargement.   Respiratory effort normal. Lungs clear to auscultation.   Heart: Regular rate and rhythm without murmurs, gallops or rubs.   ABDOMEN: soft, non distended, non tender, bowel sounds present, no hepatosplenomgaly  NO swelling of the lower extremity. Skin is not as thick         Labs 24    ASSESSMENT AND PLAN:       1.  lymphedema - doing better with lymphedema pump  3. Morbid obesity - work on diet and exercise  4. S/P MI/BSO - doing well - no menopausal symptoms    5. Partial small bowel obstruction - doing well    6.  Left subclavian thrombosis - resolved - completed 3 months of xarelto  "   7. Hypertension - better  8. Vitamin D deficency - vitamin D 5,000 units daily  9. Angiolipoma left breast - removed. MMG 7/7/23  10. Diabetes - hemoglobin A1C better = increase ozempic 1 mg once weekly  11. Liver cyst -stable on ultrasound 1/10/25  12. Back pain    BMD 6/2/25- normal      Screening - colonoscopy 2/17/25- 6 polyps - due 3 years - due 2028.     CT coronary calcium score.     Mammogram 8/20/24.     I'll see her back in 6 months with labs, sooner if problems arise.    Visit today included increased complexity associated with the care of the episodic problem  addressed and managing the longitudinal care of the patient due to the serious and/or complex managed problem(s) .

## 2025-07-21 ENCOUNTER — PATIENT MESSAGE (OUTPATIENT)
Dept: ADMINISTRATIVE | Facility: HOSPITAL | Age: 65
End: 2025-07-21
Payer: COMMERCIAL

## 2025-08-18 ENCOUNTER — HOSPITAL ENCOUNTER (OUTPATIENT)
Dept: RADIOLOGY | Facility: HOSPITAL | Age: 65
Discharge: HOME | End: 2025-08-18
Attending: INTERNAL MEDICINE
Payer: COMMERCIAL

## 2025-08-18 DIAGNOSIS — Z13.6 ENCOUNTER FOR SCREENING FOR CARDIOVASCULAR DISORDERS: ICD-10-CM

## 2025-08-18 PROCEDURE — 75571 CT HRT W/O DYE W/CA TEST: CPT | Mod: 26,,, | Performed by: RADIOLOGY

## 2025-08-18 PROCEDURE — 75571 CT HRT W/O DYE W/CA TEST: CPT | Mod: TC

## 2025-08-28 RX ORDER — DOXYCYCLINE 100 MG/1
100 CAPSULE ORAL 2 TIMES DAILY
Qty: 20 CAPSULE | Refills: 2 | Status: SHIPPED | OUTPATIENT
Start: 2025-08-28

## (undated) DEVICE — ELECTRODE BLADE INSULATED 1 IN

## (undated) DEVICE — BRA POST SURGICAL WHT 44-46IN

## (undated) DEVICE — SPONGE LAP 18X18 PREWASHED

## (undated) DEVICE — SUT 4-0 VICRYL / SH

## (undated) DEVICE — PACK UNIVERSAL SPLIT II

## (undated) DEVICE — DRAPE STERI INSTRUMENT 1018

## (undated) DEVICE — SOL 9P NACL IRR PIC IL

## (undated) DEVICE — BRA POST SURGICAL WHT 40-42IN

## (undated) DEVICE — ELECTRODE REM PLYHSV RETURN 9

## (undated) DEVICE — SUT 3-0 12-18IN SILK

## (undated) DEVICE — GAUZE SPONGE 4X4 12PLY

## (undated) DEVICE — COVERS PROBE NR-48 STERILE

## (undated) DEVICE — SHEET DRAPE FAN-FOLDED 3/4

## (undated) DEVICE — GAUZE FLUFF XXLG 36X36 2 PLY

## (undated) DEVICE — COVER PROBE NL STRL 3.6X96IN

## (undated) DEVICE — BRA POST SURGICAL WHT 48-50IN

## (undated) DEVICE — ADHESIVE DERMABOND ADVANCED

## (undated) DEVICE — TRAY MINOR GEN SURG

## (undated) DEVICE — BRA POST SURGICAL WHT 36-38IN

## (undated) DEVICE — SUT VICRYL 3-0 27 SH

## (undated) DEVICE — SEE MEDLINE ITEM 157128

## (undated) DEVICE — SEE MEDLINE ITEM 146417